# Patient Record
Sex: FEMALE | Race: WHITE | Employment: FULL TIME | URBAN - METROPOLITAN AREA
[De-identification: names, ages, dates, MRNs, and addresses within clinical notes are randomized per-mention and may not be internally consistent; named-entity substitution may affect disease eponyms.]

---

## 2017-10-25 LAB
ANTIBODY SCREEN, EXTERNAL: NEGATIVE
CHLAMYDIA, EXTERNAL: NEGATIVE
HBSAG, EXTERNAL: NEGATIVE
HGB, EXTERNAL: 13.7
HIV, EXTERNAL: NEGATIVE
N. GONORRHEA, EXTERNAL: NEGATIVE
RPR, EXTERNAL: NON REACTIVE
RUBELLA, EXTERNAL: NORMAL
URINALYSIS, EXTERNAL: NEGATIVE

## 2018-05-17 LAB — GRBS, EXTERNAL: POSITIVE

## 2018-06-06 ENCOUNTER — HOSPITAL ENCOUNTER (INPATIENT)
Age: 33
LOS: 3 days | Discharge: HOME | End: 2018-06-09
Attending: OBSTETRICS & GYNECOLOGY | Admitting: OBSTETRICS & GYNECOLOGY
Payer: COMMERCIAL

## 2018-06-06 DIAGNOSIS — R52 PAIN: Primary | ICD-10-CM

## 2018-06-06 LAB
ERYTHROCYTE [DISTWIDTH] IN BLOOD BY AUTOMATED COUNT: 13 % (ref 11.5–14.5)
HCT VFR BLD AUTO: 34.2 % (ref 35–47)
HGB BLD-MCNC: 11.9 G/DL (ref 11.5–16)
MCH RBC QN AUTO: 32.2 PG (ref 26–34)
MCHC RBC AUTO-ENTMCNC: 34.8 G/DL (ref 30–36.5)
MCV RBC AUTO: 92.7 FL (ref 80–99)
NRBC # BLD: 0 K/UL (ref 0–0.01)
NRBC BLD-RTO: 0 PER 100 WBC
PLATELET # BLD AUTO: 124 K/UL (ref 150–400)
PMV BLD AUTO: 10.5 FL (ref 8.9–12.9)
RBC # BLD AUTO: 3.69 M/UL (ref 3.8–5.2)
WBC # BLD AUTO: 7.9 K/UL (ref 3.6–11)

## 2018-06-06 PROCEDURE — 85027 COMPLETE CBC AUTOMATED: CPT | Performed by: OBSTETRICS & GYNECOLOGY

## 2018-06-06 PROCEDURE — 36415 COLL VENOUS BLD VENIPUNCTURE: CPT | Performed by: OBSTETRICS & GYNECOLOGY

## 2018-06-06 PROCEDURE — 65270000029 HC RM PRIVATE

## 2018-06-06 PROCEDURE — 75410000000 HC DELIVERY VAGINAL/SINGLE: Performed by: OBSTETRICS & GYNECOLOGY

## 2018-06-06 PROCEDURE — 59200 INSERT CERVICAL DILATOR: CPT | Performed by: OBSTETRICS & GYNECOLOGY

## 2018-06-06 PROCEDURE — 76060000078 HC EPIDURAL ANESTHESIA: Performed by: ANESTHESIOLOGY

## 2018-06-06 PROCEDURE — 75410000002 HC LABOR FEE PER 1 HR: Performed by: OBSTETRICS & GYNECOLOGY

## 2018-06-06 PROCEDURE — 77010026065 HC OXYGEN MINIMUM MEDICAL AIR: Performed by: OBSTETRICS & GYNECOLOGY

## 2018-06-06 PROCEDURE — 75410000003 HC RECOV DEL/VAG/CSECN EA 0.5 HR: Performed by: OBSTETRICS & GYNECOLOGY

## 2018-06-06 RX ORDER — NALOXONE HYDROCHLORIDE 0.4 MG/ML
0.4 INJECTION, SOLUTION INTRAMUSCULAR; INTRAVENOUS; SUBCUTANEOUS AS NEEDED
Status: DISCONTINUED | OUTPATIENT
Start: 2018-06-06 | End: 2018-06-07 | Stop reason: SDUPTHER

## 2018-06-06 RX ORDER — LIDOCAINE HYDROCHLORIDE 10 MG/ML
10 INJECTION, SOLUTION EPIDURAL; INFILTRATION; INTRACAUDAL; PERINEURAL ONCE
Status: ACTIVE | OUTPATIENT
Start: 2018-06-07 | End: 2018-06-07

## 2018-06-06 RX ORDER — ONDANSETRON 2 MG/ML
4 INJECTION INTRAMUSCULAR; INTRAVENOUS
Status: DISCONTINUED | OUTPATIENT
Start: 2018-06-06 | End: 2018-06-07 | Stop reason: HOSPADM

## 2018-06-06 NOTE — IP AVS SNAPSHOT
[address] 
[phone] Patient: Yamileth Chavez MRN: [id number] :1985 A check mary indicates which time of day the medication should be taken. My Medications START taking these medications Instructions Each Dose to Equal  
 Morning Noon Evening Bedtime  
 ibuprofen 800 mg tablet Commonly known as:  MOTRIN Your last dose was: Your next dose is: Take 1 Tab by mouth every eight (8) hours. 800 mg CHANGE how you take these medications Instructions Each Dose to Equal  
 Morning Noon Evening Bedtime  
 oxyCODONE-acetaminophen 5-325 mg per tablet Commonly known as:  PERCOCET What changed:   
- when to take this 
- reasons to take this - Another medication with the same name was removed. Continue taking this medication, and follow the directions you see here. Your last dose was: Your next dose is: Take 1 Tab by mouth every six (6) hours as needed. Max Daily Amount: 4 Tabs. 1 Tab CONTINUE taking these medications Instructions Each Dose to Equal  
 Morning Noon Evening Bedtime LEXAPRO 5 mg tablet Generic drug:  escitalopram oxalate Your last dose was: Your next dose is: Take 5 mg by mouth daily. 5 mg PROBIOTIC 4X 10-15 mg Tbec Generic drug:  B.infantis-B.ani-B.long-B.bifi Your last dose was: Your next dose is: Take  by mouth. STOP taking these medications ELMIRON 100 mg capsule Generic drug:  pentosan polysulfate sodium  
   
  
 norethindrone-ethinyl estradiol 1 mg-20 mcg (21)/75 mg (7) Tab Commonly known as:  JUNEL FE  (28)  
   
  
 predniSONE 20 mg tablet Commonly known as:  DELTASONE  
   
  
 PRENATAL AD PO  
   
  
 SULFAZINE PO  
   
  
  
  
 Where to Get Your Medications Information on where to get these meds will be given to you by the nurse or doctor. ! Ask your nurse or doctor about these medications  
  ibuprofen 800 mg tablet  
 oxyCODONE-acetaminophen 5-325 mg per tablet

## 2018-06-06 NOTE — IP AVS SNAPSHOT
303 [facility] 
 
 
 [address] [place] [address] 70 [facility] 
[phone] Patient: Jose Moseley MRN: [id number] :1985 About your hospitalization You were admitted on:  2018 You last received care in the:  [facility] You were discharged on:  2018 Why you were hospitalized Your primary diagnosis was:  Not on File Your diagnoses also included:  Pregnancy With 39 Completed Weeks Gestation, Pregnancy Follow-up Information Follow up With Details Comments Contact Info Gerry Richter MD   [address] [facility] [address] [place] [address] 
[phone] Discharge Orders None A check mary indicates which time of day the medication should be taken. My Medications START taking these medications Instructions Each Dose to Equal  
 Morning Noon Evening Bedtime  
 ibuprofen 800 mg tablet Commonly known as:  MOTRIN Your last dose was: Your next dose is: Take 1 Tab by mouth every eight (8) hours. 800 mg CHANGE how you take these medications Instructions Each Dose to Equal  
 Morning Noon Evening Bedtime  
 oxyCODONE-acetaminophen 5-325 mg per tablet Commonly known as:  PERCOCET What changed:   
- when to take this 
- reasons to take this - Another medication with the same name was removed. Continue taking this medication, and follow the directions you see here. Your last dose was: Your next dose is: Take 1 Tab by mouth every six (6) hours as needed. Max Daily Amount: 4 Tabs. 1 Tab CONTINUE taking these medications Instructions Each Dose to Equal  
 Morning Noon Evening Bedtime LEXAPRO 5 mg tablet Generic drug:  escitalopram oxalate Your last dose was: Your next dose is: Take 5 mg by mouth daily. 5 mg PROBIOTIC 4X 10-15 mg Tbec Generic drug:  B.infantis-B.ani-B.long-B.bifi Your last dose was: Your next dose is: Take  by mouth. STOP taking these medications ELMIRON 100 mg capsule Generic drug:  pentosan polysulfate sodium  
   
  
 norethindrone-ethinyl estradiol 1 mg-20 mcg (21)/75 mg (7) Tab Commonly known as:  JUNEL FE 1/20 (28)  
   
  
 predniSONE 20 mg tablet Commonly known as:  DELTASONE  
   
  
 PRENATAL AD PO  
   
  
 SULFAZINE PO Where to Get Your Medications Information on where to get these meds will be given to you by the nurse or doctor. ! Ask your nurse or doctor about these medications  
  ibuprofen 800 mg tablet  
 oxyCODONE-acetaminophen 5-325 mg per tablet Opioid Education Prescription Opioids: What You Need to Know: 
 
Prescription opioids can be used to help relieve moderate-to-severe pain and are often prescribed following a surgery or injury, or for certain health conditions. These medications can be an important part of treatment but also come with serious risks. Opioids are strong pain medicines. Examples include hydrocodone, oxycodone, fentanyl, and morphine. Heroin is an example of an illegal opioid. It is important to work with your health care provider to make sure you are getting the safest, most effective care. WHAT ARE THE RISKS AND SIDE EFFECTS OF OPIOID USE? Prescription opioids carry serious risks of addiction and overdose, especially with prolonged use. An opioid overdose, often marked by slow breathing, can cause sudden death. The use of prescription opioids can have a number of side effects as well, even when taken as directed. · Tolerance-meaning you might need to take more of a medication for the same pain relief · Physical dependence-meaning you have symptoms of withdrawal when the medication is stopped. Withdrawal symptoms can include nausea, sweating, chills, diarrhea, stomach cramps, and muscle aches. Withdrawal can last up to several weeks, depending on which drug you took and how long you took it. · Increased sensitivity to pain · Constipation · Nausea, vomiting, and dry mouth · Sleepiness and dizziness · Confusion · Depression · Low levels of testosterone that can result in lower sex drive, energy, and strength · Itching and sweating RISKS ARE GREATER WITH:      
· History of drug misuse, substance use disorder, or overdose · Mental health conditions (such as depression or anxiety) · Sleep apnea · Older age (72 years or older) · Pregnancy Avoid alcohol while taking prescription opioids. Also, unless specifically advised by your health care provider, medications to avoid include: · Benzodiazepines (such as Xanax or Valium) · Muscle relaxants (such as Soma or Flexeril) · Hypnotics (such as Ambien or Lunesta) · Other prescription opioids KNOW YOUR OPTIONS Talk to your health care provider about ways to manage your pain that don't involve prescription opioids. Some of these options may actually work better and have fewer risks and side effects. Options may include: 
· Pain relievers such as acetaminophen, ibuprofen, and naproxen · Some medications that are also used for depression or seizures · Physical therapy and exercise · Counseling to help patients learn how to cope better with triggers of pain and stress. · Application of heat or cold compress · Massage therapy · Relaxation techniques Be Informed Make sure you know the name of your medication, how much and how often to take it, and its potential risks & side effects. IF YOU ARE PRESCRIBED OPIOIDS FOR PAIN: 
· Never take opioids in greater amounts or more often than prescribed. Remember the goal is not to be pain-free but to manage your pain at a tolerable level. · Follow up with your primary care provider to: · Work together to create a plan on how to manage your pain. · Talk about ways to help manage your pain that don't involve prescription opioids. · Talk about any and all concerns and side effects. · Help prevent misuse and abuse. · Never sell or share prescription opioids · Help prevent misuse and abuse. · Store prescription opioids in a secure place and out of reach of others (this may include visitors, children, friends, and family). · Safely dispose of unused/unwanted prescription opioids: Find your community drug take-back program or your pharmacy mail-back program, or flush them down the toilet, following guidance from the Food and Drug Administration (www.fda.gov/Drugs/ResourcesForYou). · Visit www.cdc.gov/drugoverdose to learn about the risks of opioid abuse and overdose. · If you believe you may be struggling with addiction, tell your health care provider and ask for guidance or call [employer] at 9-404-055-WRRX. Discharge Instructions Discharge Instructions for Vaginal Delivery Patient ID: 
Buddy Sauer 
[id number] 
40 y.o. 
1985 Take Home Medications Continue taking your prenatal vitamins if you are breastfeeding. Follow-up care is a key part of your treatment and safety. Please schedule and keep appointments. Follow-up with your primary OB in 6 weeks. Activity Avoid anything in your vagina for 6 weeks (no intercourse, tampons, or douching). You may drive unless you are taking prescription pain medications. Climbing stairs and light lifting are okay. Please avoid excessive exercise, though walking is okay- you'll be tired! Diet Regular diet as tolerated. Be sure to drink plenty of fluids if you are breastfeeding. Wound care If you have stitches, continue to rinse with a squirt bottle of warm water each time you void for about 7-10 days. .  Your stitches will gradually dissolve over four to eight weeks. Sitz baths are also helpful to keep the wound clean, encourage healing, and to help with pain associated with the stitches or hemorrhoids. You can use either a sitz bath basin or a bathtub filled with 2-3\" inches of plain warm water. Soak for 10 minutes 3 times a day as tolerated. Pain Management 1. Over the counter medications such as Tylenol and ibuprofen (Motrin or Advil) are ideal.  These may be taken together, alternating doses. You may  take the maximum dose:  Motrin or Advil (generic ibuprofen), either 3 tablets every 6 hours or 4 tablets every 8 hours or Tylenol (acetominophen) 1000mg every 6 hours (equivalent to 2 extra strength Tylenol). 2. You may also have a precrescription for stronger pain medication. Take only as needed and transition to over the counter medication in the next few days. Minimize amounts of the prescription medication, as it can be habit-forming and will worsen or cause constipation. Most patients will find that within a couple of days, their pain is adequately controlled using only over-the-counter medications. 3. The prescription pain medication is mixed with Tylenol, therefore, you should not take any extra Tylenol or acetaminophen until you have reduced your prescription pain medication. 4. Add heating pad or sitz baths as needed. Add hemorrhoid wipes or ointments if needed Constipation 1. Constipation is normal after pregnancy and delivery, especially while taking prescription narcotic pain medication. 2. Over the counter remedies including ducosate (Colace), take 1-2 capsules 1-2 times daily for soft stool as needed. You may also add/ try milk of magnesia or rectal remedies such as Dulcolax or Fleets enema. Recovery: What to Expect at [facility] 1. Fatigue is expected.   Try to rest when you can and don't worry about doing housework or other tasks which can wait. 2. The soreness along your bottom will improve significantly over the first 2 weeks, but it may take 6 weeks before you are completely recovered. 3. Back pain or general body aches or muscle soreness are expected and should improve with acetominophen or ibuprofen. 4. Leg swelling due to pregnancy and/or IV fluids given in the hospital will take about two weeks to resolve. 5. Most women experience some form of the \"Baby Blues\" after having a baby. Feeling emotional, tearful, frustrated, anxious, sad, and irritable some of the time is normal and go away after about 2 weeks. Adequate rest and help from your family will help. Take breaks from caring for the baby. Call your doctor if your symptoms seem severe, last more than 2 weeks, or seem to be getting worse instead of better. Get help immediately if you have thoughts of wanting to hurt yourself or others! Call your doctor or seek immediate medical care if you have: 
Heavy vaginal bleeding, soaking through one or more pads an hour for several hours. Foul-smelling discharge from your vagina or incision. Consistent nausea and vomiting and cannot keep fluids down. Consistent pain that does not get better after you take pain medicine. Sudden chest pain and shortness of breath Signs of a blood clot: pain/ swelling/ increasing redness in your lower extremeties Signs of infection: increased pain in your abdomen or vaginal area; red streaks, warmth, or tenderness of your breasts; fever of 100.5 F or greater The [employer] Times Announcement We are excited to announce that we are making your provider's discharge notes available to you in The [employer] Times. You will see these notes when they are completed and signed by the physician that discharged you from your recent hospital stay.   If you have any questions or concerns about any information you see in [employer], please call the [employer] Department where you were seen or reach out to your Primary Care Provider for more information about your plan of care. Introducing [facility]! [address] introduces Keukey patient portal. Now you can access parts of your medical record, email your doctor's office, and request medication refills online. 1. In your internet browser, go to [email or web address] 2. Click on the First Time User? Click Here link in the Sign In box. You will see the New Member Sign Up page. 3. Enter your Keukey Access Code exactly as it appears below. You will not need to use this code after youve completed the sign-up process. If you do not sign up before the expiration date, you must request a new code. · Keukey Access Code: [email or web address] Expires: 9/7/2018 10:00 AM 
 
4. Enter the last four digits of your Social Security Number (xxxx) and Date of Birth (mm/dd/yyyy) as indicated and click Submit. You will be taken to the next sign-up page. 5. Create a Keukey ID. This will be your Keukey login ID and cannot be changed, so think of one that is secure and easy to remember. 6. Create a Keukey password. You can change your password at any time. 7. Enter your Password Reset Question and Answer. This can be used at a later time if you forget your password. 8. Enter your e-mail address. You will receive e-mail notification when new information is available in [address] 9. Click Sign Up. You can now view and download portions of your medical record. 10. Click the Download Summary menu link to download a portable copy of your medical information. If you have questions, please visit the Frequently Asked Questions section of the Keukey website. Remember, Keukey is NOT to be used for urgent needs. For medical emergencies, dial 911. Now available from your iPhone and Android! Introducing Oziel Mondragon As a [facility] patient, I wanted to make you aware of our electronic visit tool called Oziel Mondragon. Frockadvisor/iFit allows you to connect within minutes with a medical provider 24 hours a day, seven days a week via a mobile device or tablet or logging into a secure website from your computer. You can access Oziel Pennfin from anywhere in the [place]. A virtual visit might be right for you when you have a simple condition and feel like you just dont want to get out of bed, or cant get away from work for an appointment, when your regular [facility] provider is not available (evenings, weekends or holidays), or when youre out of town and need minor care. Electronic visits cost only $49 and if the Frockadvisor/iFit provider determines a prescription is needed to treat your condition, one can be electronically transmitted to a nearby pharmacy*. Please take a moment to enroll today if you have not already done so. The enrollment process is free and takes just a few minutes. To enroll, please download the Wurl kayla to your tablet or phone, or visit [email or web address] to enroll on your computer. And, as an [address] [place] [address] patient with a Bound[employer] account, the results of your visits will be scanned into your electronic medical record and your primary care provider will be able to view the scanned results. We urge you to continue to see your regular Torib[facility] provider for your ongoing medical care. And while your primary care provider may not be the one available when you seek a Oziel Buchanantimfin virtual visit, the peace of mind you get from getting a real diagnosis real time can be priceless. For more information on Oziel Buchanantimfin, view our Frequently Asked Questions (FAQs) at [email or web address]. Sincerely, 
 
Dianah Sicard, MD 
Chief Medical Officer Antoinette8 Eri Maldonado *:  certain medications cannot be prescribed via Oziel Mondragon Providers Seen During Your Hospitalization Provider Specialty Primary office phone Genevieve Luna MD Obstetrics & Gynecology [phone] Immunizations Administered for This Admission Name Date MMR 6/9/2018 Your Primary Care Physician (PCP) Primary Care Physician Office Phone Office Fax [address] [phone] You are allergic to the following No active allergies Recent Documentation Height Weight BMI OB Status Smoking Status 1.676 m 72.6 kg 25.82 kg/m2 Pregnant Never Smoker Emergency Contacts Name Discharge Info Relation Home Work Mobile Ghanshyam Hudson DISCHARGE CAREGIVER [3] Spouse [3] [phone] Patient Belongings The following personal items are in your possession at time of discharge: 
  Dental Appliances: None  Visual Aid: None      Home Medications: None   Jewelry: None  Clothing: At bedside    Other Valuables: None Please provide this summary of care documentation to your next provider. Signatures-by signing, you are acknowledging that this After Visit Summary has been reviewed with you and you have received a copy. Patient Signature:  ____________________________________________________________ Date:  ____________________________________________________________  
  
Gloria Hernández Provider Signature:  ____________________________________________________________ Date:  ____________________________________________________________

## 2018-06-07 ENCOUNTER — ANESTHESIA (OUTPATIENT)
Dept: LABOR AND DELIVERY | Age: 33
End: 2018-06-07
Payer: COMMERCIAL

## 2018-06-07 ENCOUNTER — ANESTHESIA EVENT (OUTPATIENT)
Dept: LABOR AND DELIVERY | Age: 33
End: 2018-06-07
Payer: COMMERCIAL

## 2018-06-07 PROBLEM — Z3A.39 PREGNANCY WITH 39 COMPLETED WEEKS GESTATION: Status: ACTIVE | Noted: 2018-06-07

## 2018-06-07 PROCEDURE — 74011250636 HC RX REV CODE- 250/636: Performed by: OBSTETRICS & GYNECOLOGY

## 2018-06-07 PROCEDURE — 75410000002 HC LABOR FEE PER 1 HR: Performed by: OBSTETRICS & GYNECOLOGY

## 2018-06-07 PROCEDURE — 74011250636 HC RX REV CODE- 250/636: Performed by: ANESTHESIOLOGY

## 2018-06-07 PROCEDURE — 3E0R3BZ INTRODUCTION OF ANESTHETIC AGENT INTO SPINAL CANAL, PERCUTANEOUS APPROACH: ICD-10-PCS | Performed by: ANESTHESIOLOGY

## 2018-06-07 PROCEDURE — 00HU33Z INSERTION OF INFUSION DEVICE INTO SPINAL CANAL, PERCUTANEOUS APPROACH: ICD-10-PCS | Performed by: ANESTHESIOLOGY

## 2018-06-07 PROCEDURE — 74011000258 HC RX REV CODE- 258: Performed by: OBSTETRICS & GYNECOLOGY

## 2018-06-07 PROCEDURE — 10907ZC DRAINAGE OF AMNIOTIC FLUID, THERAPEUTIC FROM PRODUCTS OF CONCEPTION, VIA NATURAL OR ARTIFICIAL OPENING: ICD-10-PCS | Performed by: OBSTETRICS & GYNECOLOGY

## 2018-06-07 PROCEDURE — 74011000250 HC RX REV CODE- 250: Performed by: ANESTHESIOLOGY

## 2018-06-07 PROCEDURE — 65270000029 HC RM PRIVATE

## 2018-06-07 PROCEDURE — 74011250636 HC RX REV CODE- 250/636

## 2018-06-07 PROCEDURE — 74011250637 HC RX REV CODE- 250/637: Performed by: OBSTETRICS & GYNECOLOGY

## 2018-06-07 RX ORDER — SODIUM CHLORIDE, SODIUM LACTATE, POTASSIUM CHLORIDE, CALCIUM CHLORIDE 600; 310; 30; 20 MG/100ML; MG/100ML; MG/100ML; MG/100ML
125 INJECTION, SOLUTION INTRAVENOUS CONTINUOUS
Status: DISCONTINUED | OUTPATIENT
Start: 2018-06-07 | End: 2018-06-08

## 2018-06-07 RX ORDER — NALBUPHINE HYDROCHLORIDE 10 MG/ML
10 INJECTION, SOLUTION INTRAMUSCULAR; INTRAVENOUS; SUBCUTANEOUS
Status: DISCONTINUED | OUTPATIENT
Start: 2018-06-07 | End: 2018-06-09 | Stop reason: HOSPADM

## 2018-06-07 RX ORDER — OXYTOCIN/RINGER'S LACTATE 20/1000 ML
125-500 PLASTIC BAG, INJECTION (ML) INTRAVENOUS ONCE
Status: ACTIVE | OUTPATIENT
Start: 2018-06-07 | End: 2018-06-08

## 2018-06-07 RX ORDER — NALOXONE HYDROCHLORIDE 0.4 MG/ML
0.4 INJECTION, SOLUTION INTRAMUSCULAR; INTRAVENOUS; SUBCUTANEOUS AS NEEDED
Status: DISCONTINUED | OUTPATIENT
Start: 2018-06-07 | End: 2018-06-07 | Stop reason: HOSPADM

## 2018-06-07 RX ORDER — SODIUM CHLORIDE 0.9 % (FLUSH) 0.9 %
5-10 SYRINGE (ML) INJECTION EVERY 8 HOURS
Status: DISCONTINUED | OUTPATIENT
Start: 2018-06-07 | End: 2018-06-08

## 2018-06-07 RX ORDER — SIMETHICONE 80 MG
80 TABLET,CHEWABLE ORAL
Status: DISCONTINUED | OUTPATIENT
Start: 2018-06-07 | End: 2018-06-09 | Stop reason: HOSPADM

## 2018-06-07 RX ORDER — OXYTOCIN IN 5 % DEXTROSE 30/500 ML
PLASTIC BAG, INJECTION (ML) INTRAVENOUS
Status: COMPLETED
Start: 2018-06-07 | End: 2018-06-07

## 2018-06-07 RX ORDER — HYDROCORTISONE ACETATE PRAMOXINE HCL 2.5; 1 G/100G; G/100G
CREAM TOPICAL AS NEEDED
Status: DISCONTINUED | OUTPATIENT
Start: 2018-06-07 | End: 2018-06-09 | Stop reason: HOSPADM

## 2018-06-07 RX ORDER — FENTANYL/BUPIVACAINE/NS/PF 2-1250MCG
1-16 PREFILLED PUMP RESERVOIR EPIDURAL CONTINUOUS
Status: DISCONTINUED | OUTPATIENT
Start: 2018-06-07 | End: 2018-06-07 | Stop reason: HOSPADM

## 2018-06-07 RX ORDER — DIPHENHYDRAMINE HCL 25 MG
25 CAPSULE ORAL
Status: DISCONTINUED | OUTPATIENT
Start: 2018-06-07 | End: 2018-06-09 | Stop reason: HOSPADM

## 2018-06-07 RX ORDER — EPHEDRINE SULFATE 50 MG/ML
10 INJECTION, SOLUTION INTRAVENOUS
Status: COMPLETED | OUTPATIENT
Start: 2018-06-07 | End: 2018-06-07

## 2018-06-07 RX ORDER — SODIUM CHLORIDE 0.9 % (FLUSH) 0.9 %
5-10 SYRINGE (ML) INJECTION AS NEEDED
Status: DISCONTINUED | OUTPATIENT
Start: 2018-06-07 | End: 2018-06-09 | Stop reason: HOSPADM

## 2018-06-07 RX ORDER — ONDANSETRON 4 MG/1
4 TABLET, ORALLY DISINTEGRATING ORAL
Status: ACTIVE | OUTPATIENT
Start: 2018-06-07 | End: 2018-06-08

## 2018-06-07 RX ORDER — OXYCODONE AND ACETAMINOPHEN 5; 325 MG/1; MG/1
1 TABLET ORAL
Status: DISCONTINUED | OUTPATIENT
Start: 2018-06-07 | End: 2018-06-09 | Stop reason: HOSPADM

## 2018-06-07 RX ORDER — DOCUSATE SODIUM 100 MG/1
100 CAPSULE, LIQUID FILLED ORAL
Status: DISCONTINUED | OUTPATIENT
Start: 2018-06-07 | End: 2018-06-09 | Stop reason: HOSPADM

## 2018-06-07 RX ORDER — MINERAL OIL
120 OIL (ML) ORAL ONCE
Status: ACTIVE | OUTPATIENT
Start: 2018-06-07 | End: 2018-06-07

## 2018-06-07 RX ORDER — MAG HYDROX/ALUMINUM HYD/SIMETH 200-200-20
30 SUSPENSION, ORAL (FINAL DOSE FORM) ORAL
Status: DISCONTINUED | OUTPATIENT
Start: 2018-06-07 | End: 2018-06-07 | Stop reason: HOSPADM

## 2018-06-07 RX ORDER — SODIUM CHLORIDE, SODIUM LACTATE, POTASSIUM CHLORIDE, CALCIUM CHLORIDE 600; 310; 30; 20 MG/100ML; MG/100ML; MG/100ML; MG/100ML
INJECTION, SOLUTION INTRAVENOUS
Status: DISCONTINUED | OUTPATIENT
Start: 2018-06-07 | End: 2018-06-07 | Stop reason: HOSPADM

## 2018-06-07 RX ORDER — IBUPROFEN 800 MG/1
800 TABLET ORAL EVERY 8 HOURS
Status: DISCONTINUED | OUTPATIENT
Start: 2018-06-07 | End: 2018-06-09 | Stop reason: HOSPADM

## 2018-06-07 RX ORDER — NALOXONE HYDROCHLORIDE 0.4 MG/ML
0.4 INJECTION, SOLUTION INTRAMUSCULAR; INTRAVENOUS; SUBCUTANEOUS AS NEEDED
Status: DISCONTINUED | OUTPATIENT
Start: 2018-06-07 | End: 2018-06-09 | Stop reason: HOSPADM

## 2018-06-07 RX ORDER — SODIUM CHLORIDE 9 MG/ML
125 INJECTION, SOLUTION INTRAVENOUS CONTINUOUS
Status: DISCONTINUED | OUTPATIENT
Start: 2018-06-07 | End: 2018-06-08

## 2018-06-07 RX ORDER — ACETAMINOPHEN 325 MG/1
650 TABLET ORAL
Status: DISCONTINUED | OUTPATIENT
Start: 2018-06-07 | End: 2018-06-07 | Stop reason: HOSPADM

## 2018-06-07 RX ORDER — OXYTOCIN IN 5 % DEXTROSE 30/500 ML
2 PLASTIC BAG, INJECTION (ML) INTRAVENOUS
Status: DISCONTINUED | OUTPATIENT
Start: 2018-06-08 | End: 2018-06-08

## 2018-06-07 RX ORDER — NALBUPHINE HYDROCHLORIDE 10 MG/ML
INJECTION, SOLUTION INTRAMUSCULAR; INTRAVENOUS; SUBCUTANEOUS
Status: DISPENSED
Start: 2018-06-07 | End: 2018-06-07

## 2018-06-07 RX ORDER — ONDANSETRON 2 MG/ML
4 INJECTION INTRAMUSCULAR; INTRAVENOUS
Status: DISCONTINUED | OUTPATIENT
Start: 2018-06-07 | End: 2018-06-07 | Stop reason: SDUPTHER

## 2018-06-07 RX ADMIN — Medication 10 ML: at 18:00

## 2018-06-07 RX ADMIN — PENICILLIN G POTASSIUM 2.5 MILLION UNITS: 20000000 POWDER, FOR SOLUTION INTRAVENOUS at 12:56

## 2018-06-07 RX ADMIN — SODIUM CHLORIDE, SODIUM LACTATE, POTASSIUM CHLORIDE, AND CALCIUM CHLORIDE 1000 ML: 600; 310; 30; 20 INJECTION, SOLUTION INTRAVENOUS at 07:00

## 2018-06-07 RX ADMIN — OXYCODONE HYDROCHLORIDE AND ACETAMINOPHEN 1 TABLET: 5; 325 TABLET ORAL at 22:07

## 2018-06-07 RX ADMIN — SODIUM CHLORIDE, SODIUM LACTATE, POTASSIUM CHLORIDE, AND CALCIUM CHLORIDE 125 ML/HR: 600; 310; 30; 20 INJECTION, SOLUTION INTRAVENOUS at 14:23

## 2018-06-07 RX ADMIN — SODIUM CHLORIDE, SODIUM LACTATE, POTASSIUM CHLORIDE, CALCIUM CHLORIDE: 600; 310; 30; 20 INJECTION, SOLUTION INTRAVENOUS at 07:00

## 2018-06-07 RX ADMIN — Medication 12 ML/HR: at 07:45

## 2018-06-07 RX ADMIN — IBUPROFEN 800 MG: 800 TABLET ORAL at 18:29

## 2018-06-07 RX ADMIN — SODIUM CHLORIDE 5 MILLION UNITS: 900 INJECTION, SOLUTION INTRAVENOUS at 00:18

## 2018-06-07 RX ADMIN — SODIUM CHLORIDE 500 ML: 900 INJECTION, SOLUTION INTRAVENOUS at 14:37

## 2018-06-07 RX ADMIN — NALBUPHINE HYDROCHLORIDE 10 MG: 10 INJECTION, SOLUTION INTRAMUSCULAR; INTRAVENOUS; SUBCUTANEOUS at 01:09

## 2018-06-07 RX ADMIN — PENICILLIN G POTASSIUM 2.5 MILLION UNITS: 20000000 POWDER, FOR SOLUTION INTRAVENOUS at 07:51

## 2018-06-07 RX ADMIN — PENICILLIN G POTASSIUM 2.5 MILLION UNITS: 20000000 POWDER, FOR SOLUTION INTRAVENOUS at 04:04

## 2018-06-07 RX ADMIN — SODIUM CHLORIDE, SODIUM LACTATE, POTASSIUM CHLORIDE, AND CALCIUM CHLORIDE 125 ML/HR: 600; 310; 30; 20 INJECTION, SOLUTION INTRAVENOUS at 09:25

## 2018-06-07 RX ADMIN — PENICILLIN G POTASSIUM 2.5 MILLION UNITS: 20000000 POWDER, FOR SOLUTION INTRAVENOUS at 16:07

## 2018-06-07 RX ADMIN — EPHEDRINE SULFATE 10 MG: 50 INJECTION, SOLUTION INTRAVENOUS at 08:02

## 2018-06-07 RX ADMIN — ONDANSETRON 4 MG: 2 INJECTION INTRAMUSCULAR; INTRAVENOUS at 10:08

## 2018-06-07 RX ADMIN — Medication 30000 MILLI-UNITS: at 16:47

## 2018-06-07 NOTE — L&D DELIVERY NOTE
Delivery Note:     Patient reached FD and pushed with good effort to deliver the fetal head in VICKIE position. Single nuchal cord found. The anterior shoulder, followed by the posterior shoulder and the rest of the body then delivered easily. Single nuchal cord reduced. This was a LBMI with Apgars of 9 and 9 at 1 and 5 minutes respectively, weight pending. The infant was placed on mom's abdomen. The cord was then double clamped and cut by the FOB. Cord blood was taken. The placenta followed spontaneously, intact, with 3VC. Pitocin was added to the IVF and the fundus was firm to palpation. The vagina, cervix and perineum were examined and first degree laceration was found and not repaired as hemostatic.  mL. No complications. Mom and baby doing well. Dr. Jaqui Villarreal delivering.      Fatimah Reynoso MD  [facility]

## 2018-06-07 NOTE — PROGRESS NOTES
06/07/18 3:02 PM  CM met with patient to complete initial assessment and to begin discharge planning. Demographics were reviewed and confirmed. Patient and her /FOB Amber Casillas ([phone]) live together in [place], [place], and have 2 older children, ages 3 and 2. 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 FOB works for [employer] and will have 8 weeks total away from work, although he will only take 4 weeks now and save his last 4 weeks to use in a few months. Supports include patient's mother and other close friends. Patient plans to breast and bottle feed and has a pump to use at home. Dr. Slick Mcdaniel at [address] [facility] office) will provide medical follow up for the baby. Patient has car seat, crib, clothing, and other necessary supplies. Denied need for [facility] and Medicaid services. Care Management Interventions  PCP Verified by CM:  Yes  Mode of Transport at Discharge: Self  Transition of Care Consult (CM Consult): Discharge Planning  Current Support Network: Lives with Spouse  Confirm Follow Up Transport: Family  Plan discussed with Pt/Family/Caregiver: Yes  Discharge Location  Discharge Placement: Home with family assistance  HERBIE Holder

## 2018-06-07 NOTE — ANESTHESIA PROCEDURE NOTES
Epidural Block    Start time: 6/7/2018 7:08 AM  End time: 6/7/2018 7:32 AM  Performed by: Rogelio Gómez  Authorized by: Rogelio Gómez     Pre-Procedure  Indication: labor epidural    Preanesthetic Checklist: patient identified, risks and benefits discussed, anesthesia consent, site marked, patient being monitored, timeout performed and anesthesia consent    Timeout Time: 07:09        Epidural:   Patient position:  Seated  Prep region:  Lumbar  Prep: Chlorhexidine    Location:  L3-4    Needle and Epidural Catheter:   Needle Type:  Tuohy  Needle Gauge:  18 G  Injection Technique:  Loss of resistance using saline  Attempts:  1  Catheter Size:  20 G  Catheter at Skin Depth (cm):  10  Depth in Epidural Space (cm):  5  Events: no blood with aspiration, no cerebrospinal fluid with aspiration, no paresthesia and negative aspiration test    Test Dose:  Lidocaine 1.5% w/ epi and negative    Assessment:   Catheter Secured:  Tegaderm and tape  Insertion:  Uncomplicated  Patient tolerance:  Patient tolerated the procedure well with no immediate complications

## 2018-06-07 NOTE — PROGRESS NOTES
Labor Note    Kobe Parents  [id number]  1985   39w2d      S:  Feeling comfortable with epidural.    O:    Visit Vitals    BP 93/55    Pulse 66    Temp 97.5 °F (36.4 °C)    Resp 14    Ht 5' 6\" (1.676 m)    Wt 72.6 kg (160 lb)    SpO2 100%    BMI 25.82 kg/m2     Cervical Exam  Dilation (cm): 5  Eff: 70 %  Station: 0  Vaginal exam done by? : HENRY Pereira  Membrane Status: AROM    Patient Vitals for the past 4 hrs: Mode Fetal Heart Rate Variability Decelerations Accelerations RN Reviewed Strip? FHR Interventions   18 1130 - - - - - - (P) Oxygen   18 1128 - - - - - - (P) Lateral Right;Other (comment)   18 1122 - - - - - - (P) Lateral Right   18 1100 External 100 6-25 BPM Early;Variable Yes Yes -   18 1030 External 115 6-25 BPM None Yes Yes -   18 1014 - - - - - - IV Fluid Bolus   18 1000 External 110 6-25 BPM None Yes Yes -   18 0900 External 115 6-25 BPM None Yes Yes -   18 0830 External 105 6-25 BPM None Yes Yes -   18 0800 External 105 6-25 BPM (!) Early; Late Yes Yes -       A/P:  29 y.o.  @ 39w2d - labor   1. [facility]/[place]-- IUPC, FSE placed for closer monitoring given baseline 100s w intermittent variable decelerations. Continue to monitor. COnsider AI. 2.  GBS pos / Rh pos  3. Pitocin n/a at this time  4. Pain control - epidural  5. Lara 2-4 hours, or prn. Expect .       Clayton Dvais MD  [facility]

## 2018-06-07 NOTE — PROGRESS NOTES
07:00- OB SBAR report received from Olaf Mullins at bedside. Pt ready for epidural placement. MD Cruz at bedside to begin procedure. 07:09- Time out completed for epidural placement. 08:06- MD Cruz notified of pt decrease in BP after epidural. BP 70s/30s. RN notified pt given 10mg ephedrine IV per order with minimal improvement in BP. Pt baseline 100s-90s/50s. Pt currently asymptomatic. MD states to decrease rate of epidural from 12ml/hr to 8ml/hr. Bolus LR if needed per orders. 10:15- BP has normalized until pt turned to L side. BP cuff on R arm. BP now 70s/30s. Pt asymptomatic except for nausea which may also be related to epidural, clear liquid diet, or hormonal fluctuations. Zofran given for nausea. IV fluid bolus of 500cc initiated. Will continue to monitor BP.  14:05- RN received VORB from MD Candi to check cervix. If no change, start pitocin. If pt has made cervical change, no pitocin needed at this time. 14:40- Amnio infusion bolus initiated per order  14:55- Amnioinfusion rate decreased from 999ml/hr to 500ml/hr and pt given IV fluid bolus  14:57- Pt BP still variable from SBP 100s-70s and SBP from 90s-40s. Pt has remained asymptomatic.  16:12- MD Candi notified pt FD. MD states RN does not need to begin pushing as pt is a multip. Plan to ready room for delivery and await MD.  16:30- MD at bedside for delivery  16:43- Pt delivered viable infant male via  18 at 16:43. First degree without repair. Spontaneous removal of placenta and PPP per orders. Infant skin to skin with patient at delivery. Nursery RN at bedside. Apgars 9/9 at 1 and 5 min.

## 2018-06-07 NOTE — PROGRESS NOTES
1937  VS and assessment completed. Pt reports that she got up to go to the bathroom with her mothers assistance. Pt states that she was able to urinate w/o difficulty and her bleeding was ok. Pt reminded that she needs to let the nurse know when she needs to use the bathroom, pt verbalized understanding of instructions. 2015 TRANSFER - OUT REPORT:    Verbal bedside report given to HENRY Carlson on Mukesh Mcnally  being transferred to [facility] for routine progression of care       Report consisted of patients Situation, Background, Assessment and   Recommendations(SBAR). Information from the following report(s) SBAR, Procedure Summary, Intake/Output and MAR was reviewed with the receiving nurse. Lines:   Peripheral IV 06/06/18 Left Hand (Active)   Site Assessment Clean, dry, & intact 6/7/2018  3:14 PM   Phlebitis Assessment 0 6/7/2018  3:14 PM   Infiltration Assessment 0 6/7/2018  3:14 PM   Dressing Status Clean, dry, & intact 6/7/2018  3:14 PM   Dressing Type Transparent 6/7/2018  3:14 PM   Hub Color/Line Status Infusing 6/7/2018  3:14 PM   Alcohol Cap Used Yes 6/7/2018  3:14 PM        Opportunity for questions and clarification was provided.       Patient transported with:   Registered Nurse

## 2018-06-07 NOTE — PROGRESS NOTES
Labor Note    Benigno Ugalde  [id number]  1985   39w2d      S:  Feeling comfortable with epidural.    O:    Visit Vitals    /59    Pulse (!) 46    Temp 97.5 °F (36.4 °C)    Resp 14    Ht 5' 6\" (1.676 m)    Wt 72.6 kg (160 lb)    SpO2 100%    BMI 25.82 kg/m2     Cervical Exam  Dilation (cm): 4  Eff: 70 %  Vaginal exam done by? : Hemple  Membrane Status: AROM    Patient Vitals for the past 4 hrs: Mode Fetal Heart Rate Variability Decelerations Accelerations RN Reviewed Strip? Provider who reviewed strip?   18 0630 External 110 6-25 BPM None Yes Yes Mirella   18 0600 External 100 6-25 BPM Variable Yes Yes -   18 0530 External 100 6-25 BPM None Yes Yes -   18 0500 External 95 6-25 BPM None Yes Yes -       A/P:  29 y.o.  @ 39w2d - labor   1. CEFM/Doran  2. GBS pos / Rh pos  3. Pitocin as needed (none currently, sp AROM)  4.  Pain control - epidural  5. Lara 4 hours, or prn. Expect .       Kelly Tran MD  [facility]

## 2018-06-07 NOTE — H&P
Labor and Delivery Admission Note  2018    Patient is a 29 y.o. [id number] at 39w2d here for scheduled elective induction of labor. PNC: Blood type: O            RH: pos            Hep B: neg            Rubella: equivocal            GBS status: positive    OBHX:   x2, SAB x 1  OB History      Para Term  AB Living    4 2 2  1 1    SAB TAB Ectopic Molar Multiple Live Births    1    0 1          PMH:   Past Medical History:   Diagnosis Date    Psychiatric problem     Urinary frequency        PSH:   Past Surgical History:   Procedure Laterality Date    HX APPENDECTOMY      2006    HX HEENT      wisdom teeth    HX OTHER SURGICAL      psoriatic arthritis    HX OTHER SURGICAL      mild colitis    HX WISDOM TEETH EXTRACTION Bilateral        OB/GYN:[facility]    Meds:   Prior to Admission Medications   Prescriptions Last Dose Informant Patient Reported? Taking? B.infantis-B.ani-B.long-B.bifi (PROBIOTIC 4X) 10-15 mg TbEC 2018 at 0900  Yes Yes   Sig: Take  by mouth. PRENATAL VIT 15/IRON CB/FA/DSS (PRENATAL AD PO) Not Taking at Unknown time  Yes No   Sig: Take  by mouth daily. SULFASALAZINE (SULFAZINE PO) Not Taking at Unknown time  Yes No   Sig: Take  by mouth four (4) times daily. escitalopram oxalate (LEXAPRO) 5 mg tablet 2018 at 0900  Yes Yes   Sig: Take 5 mg by mouth daily. norethindrone-ethinyl estradiol (JUNEL FE , ,) 1-20 mg-mcg per tablet Not Taking at Unknown time  No No   Sig: Take 1 Tab by mouth daily. oxyCODONE-acetaminophen (PERCOCET) 5-325 mg per tablet Not Taking at Unknown time  No No   Sig: Take 1 Tab by mouth every four (4) hours as needed for Pain. oxyCODONE-acetaminophen (PERCOCET) 5-325 mg per tablet Not Taking at Unknown time  No No   Sig: Take 2 Tabs by mouth every four (4) hours as needed. Max Daily Amount: 12 Tabs. pentosan polysulfate sodium (ELMIRON) 100 mg capsule Not Taking at Unknown time  Yes No   Sig: Take 100 mg by mouth two (2) times a day. predniSONE (DELTASONE) 20 mg tablet Not Taking at Unknown time  Yes No   Sig: Take 20 mg by mouth daily. Facility-Administered Medications: None       Allergies: No Known Allergies    Pertinent ROS: Review of Systems - Negative   FMH:   Family History   Problem Relation Age of Onset    Hypertension Father        SH:   Social History     Social History    Marital status:      Spouse name: N/A    Number of children: N/A    Years of education: N/A       Social History Main Topics    Smoking status: Never Smoker    Smokeless tobacco: Never Used    Alcohol use No    Drug use: No    Sexual activity: Yes     Partners: Male     Birth control/ protection: None           OBJECTIVE:    Temp (24hrs), Av.9 °F (37.2 °C), Min:98.9 °F (37.2 °C), Max:98.9 °F (37.2 °C)    Visit Vitals    BP 96/47    Pulse 67    Temp 98.9 °F (37.2 °C)    Resp 14    Ht 5' 6\" (1.676 m)    SpO2 99%       FHR baseline 100, + accels, no decels, moderate variability  [place]     Exam:  General: alert  HEENT:  normal   Lungs:  clear  Cor:  RRR  Abdomen:  Soft, non-tender                    Clinical EFW 7 lbs  Cervix:  1/40/high; vertex  Fluid:  None seen  Pelvimetry:  AP-good                      Arch- adequate                      Sidewalls- adequate                      Pelvis feels adequate for fetus. Extremities:  normal, no edema      Impression:  at 39w2d, GBS+, IOL. Plan:  Discussed ripening with Ana Bougie catheter as per existing plan of care discussed by Dr. Gaetano Grant at sign-out. Patient amenable to this. Ana Bougie catheter placed via  guidelines without difficulty; 40cc placed in each balloon. Will start penicillin for GBS prophylaxis.     Davi Middleton MD  12:02 AM

## 2018-06-07 NOTE — ANESTHESIA PREPROCEDURE EVALUATION
Anesthetic History   No history of anesthetic complications            Review of Systems / Medical History  Patient summary reviewed, nursing notes reviewed and pertinent labs reviewed    Pulmonary  Within defined limits                 Neuro/Psych   Within defined limits           Cardiovascular  Within defined limits                Exercise tolerance: >4 METS     GI/Hepatic/Renal  Within defined limits              Endo/Other        Arthritis     Other Findings              Physical Exam    Airway  Mallampati: II  TM Distance: > 6 cm  Neck ROM: normal range of motion   Mouth opening: Normal     Cardiovascular  Regular rate and rhythm,  S1 and S2 normal,  no murmur, click, rub, or gallop  Rhythm: regular  Rate: normal         Dental  No notable dental hx       Pulmonary  Breath sounds clear to auscultation               Abdominal         Other Findings            Anesthetic Plan    ASA: 2  Anesthesia type: CSE            Anesthetic plan and risks discussed with: Patient

## 2018-06-07 NOTE — DISCHARGE SUMMARY
Obstetrical Discharge Summary     Name: Ángel Grijalva MRN: [id number]  SSN: [id number]    YOB: 1985  Age: 29 y.o. Sex: female      Admit Date: 6/6/2018    Discharge Date: 6/9/18     Attending Physician:  Trevon Henriquez MD     Delivering Physician:  Mayelin Cohn MD     * Admission Diagnoses:   IUP @ 39w2d      * Discharge Diagnoses:   Delivery of a VMI via TSVD by Mayelin Cohn MD on 6/7/2018. Apgars were 9 and 9. Additional Diagnoses:   Hospital Problems as of 6/7/2018  Date Reviewed: 6/7/2018          Codes Class Noted - Resolved POA    Pregnancy with 39 completed weeks gestation ICD-10-CM: Z3A.39  ICD-9-CM: V22.2  6/7/2018 - Present Unknown             Lab Results   Component Value Date/Time    Rubella, External equivocal 10/25/2017 08:00 AM    GrBStrep, External positive 05/17/2018 08:00 AM      Immunization History   Administered Date(s) Administered    Tdap 06/28/2015       * Procedures:   TSVD         * Discharge Condition: good    * Hospital Course: Normal hospital course following the delivery. * Disposition: Home    Discharge Medications:   Current Discharge Medication List          * Follow-up Care/Patient Instructions: Activity: Activity as tolerated  Diet: Regular Diet  Wound Care: As directed      Followup 2 weeks for PP check  Or prn given hx of anxiety on meds.        Signed By:  Trevon Henriquez MD     June 7, 2018

## 2018-06-07 NOTE — PROGRESS NOTES
2230  Patient arrived on unit for herrera bulb IOL. 46  Dr Martha Hussein at bedside to place Remonia Lis Catheter FB. 40cc sterile water placed in inner and outer balloon. MD aware of low fetal baseline heart rate. Per MD, continue to monitor. 0400  Patient up to restroom. [address] with Dr Martha Hussein regarding patient low fetal heartrate baseline. Per MD, no further narcotics. Per MD continue to monitor.

## 2018-06-08 PROCEDURE — 65270000029 HC RM PRIVATE

## 2018-06-08 PROCEDURE — 77030018846 HC SOL IRR STRL H20 ICUM -A

## 2018-06-08 PROCEDURE — 74011250637 HC RX REV CODE- 250/637: Performed by: OBSTETRICS & GYNECOLOGY

## 2018-06-08 RX ADMIN — OXYCODONE HYDROCHLORIDE AND ACETAMINOPHEN 1 TABLET: 5; 325 TABLET ORAL at 16:04

## 2018-06-08 RX ADMIN — OXYCODONE HYDROCHLORIDE AND ACETAMINOPHEN 1 TABLET: 5; 325 TABLET ORAL at 02:31

## 2018-06-08 RX ADMIN — IBUPROFEN 800 MG: 800 TABLET ORAL at 17:55

## 2018-06-08 RX ADMIN — IBUPROFEN 800 MG: 800 TABLET ORAL at 02:31

## 2018-06-08 RX ADMIN — IBUPROFEN 800 MG: 800 TABLET ORAL at 10:01

## 2018-06-08 RX ADMIN — DOCUSATE SODIUM 100 MG: 100 CAPSULE, LIQUID FILLED ORAL at 12:19

## 2018-06-08 RX ADMIN — OXYCODONE HYDROCHLORIDE AND ACETAMINOPHEN 1 TABLET: 5; 325 TABLET ORAL at 12:19

## 2018-06-08 RX ADMIN — OXYCODONE HYDROCHLORIDE AND ACETAMINOPHEN 1 TABLET: 5; 325 TABLET ORAL at 20:04

## 2018-06-08 RX ADMIN — OXYCODONE HYDROCHLORIDE AND ACETAMINOPHEN 1 TABLET: 5; 325 TABLET ORAL at 07:14

## 2018-06-08 NOTE — PROGRESS NOTES
PostPartum Note    Lise Marr  [id number]  1985  29 y.o.    S:  Ms. Lise Marr is a 29 y.o.  P1D # s/p  @ 39w3d. Doing well. She had a baby boy. Her lochia is like a period. She describes her pain as mild and is well controlled with PO medications. She is breast feeding and this is going well. She is ambulating and voiding. Tolerating PO intake. O:   Visit Vitals    /63 (BP 1 Location: Left arm, BP Patient Position: At rest)    Pulse (!) 56    Temp 98.2 °F (36.8 °C)    Resp 16    Ht 5' 6\" (1.676 m)    Wt 72.6 kg (160 lb)    SpO2 100%    BMI 25.82 kg/m2       Lab Results   Component Value Date/Time    WBC 7.9 2018 11:20 PM    HGB 11.9 2018 11:20 PM    HCT 34.2 (L) 2018 11:20 PM    PLATELET 146 (L)  11:20 PM    MCV 92.7 2018 11:20 PM    Hgb, External 13.7 10/25/2017 08:00 AM    Hct, External 36.1 2014    Platelet cnt., External 99 06/10/2015       Gen - No acute distress  Abdomen - Fundus firm, below the umbilicus   Ext - Warm, well perfused. Nontender    A/P:  PPD #1 s/p  @ 39w3d doing well. 1.  Routine PP instructions/ care discussed  2. Blood type - Rh +  3. Rubella equiv - MMR PP   4. Circumcision if desired    5. Discharge PPD#2    6. F/U 4-6 weeks for PP check.       Blanche Putnam MD  [facility]

## 2018-06-08 NOTE — PROGRESS NOTES
Bedside and Verbal shift change report given to Margarette Paula RN (oncoming nurse) by Yordy Kelly RNC (offgoing nurse). Report included the following information SBAR, Kardex, Intake/Output, MAR and Recent Results.

## 2018-06-08 NOTE — ROUTINE PROCESS
Bedside and Verbal shift change report given to Zully Narvaez RN (oncoming nurse) by Cecile Davalos RN (offgoing nurse). Report included the following information SBAR, Kardex, Intake/Output and MAR.

## 2018-06-08 NOTE — LACTATION NOTE
Problem: Lactation Care Plan  Goal: *Infant latching appropriately  Outcome: Progressing Towards Goal  Mom states comfortable and relaxed with breast feeding. f Using shield because used with last baby and states her nipples stay flat at times.      Pt will successfully establish breastfeeding by feeding in response to early feeding cues   or wake every 3h, will obtain deep latch, and will keep log of feedings/output. Taught to BF at hunger cues and or q 2-3 hrs and to offer 10-20 drops of hand expressed colostrum at any non-feeds.       Breast Assessment  Left Breast: Medium  Left Nipple: Everted, Intact, Short  Right Nipple: Everted, Intact, Short  Breast- Feeding Assessment  Attends Breast-Feeding Classes: No  Breast-Feeding Experience: Yes (about 2-4 months plus pumped for 1 year)  Breast Trauma/Surgery: No  Type/Quality: Good (per mom, not seen at breast)        Goal: *Weight loss less than 10% of birth weight  Outcome: Progressing Towards Goal     Encouraged mom to attempt feeding with baby led feeding cues. Just as sucking on fingers, rooting, mouthing. Looking for 8-12 feedings in 24 hours. Don't limit baby at breast, allow baby to come of breast on it's own. Baby may want to feed  often and may increase number of feedings on second day of life. Skin to skin encouraged.       If baby doesn't nurse,  Mom should  hand express  10-20 drops of colostrum and drip into baby's mouth, or give to baby by finger feeding, cup feeding, or spoon feeding at least every 2-3 hours.         Problem: Patient Education: Go to Patient Education Activity  Goal: Patient/Family Education  Outcome: Progressing Towards Goal  Reviewed breastfeeding basics:  Supply and demand,  stomach size, early  Feeding cues, skin to skin, positioning and baby led latch-on, assymetrical latch with signs of good, deep latch vs shallow, feeding frequency and duration, and log sheet for tracking infant feedings and output.   Breastfeeding Booklet and Warm line information given. Discussed typical  weight loss and the importance of infant weight checks with pediatrician 1-2 post discharge.     Discussed with mother her plan for feeding. Reviewed the benefits of exclusive breast milk feeding during the hospital stay. Informed her of the risks of using formula to supplement in the first few days of life as well as the benefits of successful breast milk feeding; referred her to the Breastfeeding booklet about this information. She acknowledges understanding of information reviewed and states that it is her plan to both breast feed and formula feed and plans to pump also to her infant.   Will support her choice and offer additional information as needed.

## 2018-06-08 NOTE — ROUTINE PROCESS
SBAR IN Report: Mother    Verbal report received from Vicente Ceballos RN (full name & credentials) on this patient, who is now being transferred from L&D (unit) for routine progression of care. Report consisted of patient's Situation, Background, Assessment and Recommendations (SBAR). [place] ID bands were compared with the identification form, and verified with the patient and transferring nurse. Information from the SBAR, Kardex, Intake/Output and MAR and the [place] Report was reviewed with the transferring nurse; opportunity for questions and clarification provided.

## 2018-06-08 NOTE — PROGRESS NOTES
1217 Pt is sitting in the bed and she is holding the baby  1311 Pt is sitting in the bed and she is watching the baby have his pictures taken  1420 Pt is sitting in the bed and she is eating lunch

## 2018-06-08 NOTE — PROGRESS NOTES
Bedside and Verbal shift change report given to May Oliveira RN (oncoming nurse) by Renard Ragsdale RN (offgoing nurse). Report included the following information SBAR, Intake/Output and MAR.

## 2018-06-09 VITALS
BODY MASS INDEX: 25.71 KG/M2 | TEMPERATURE: 97.8 F | SYSTOLIC BLOOD PRESSURE: 105 MMHG | RESPIRATION RATE: 15 BRPM | DIASTOLIC BLOOD PRESSURE: 59 MMHG | OXYGEN SATURATION: 100 % | WEIGHT: 160 LBS | HEIGHT: 66 IN | HEART RATE: 51 BPM

## 2018-06-09 PROCEDURE — 74011250637 HC RX REV CODE- 250/637: Performed by: OBSTETRICS & GYNECOLOGY

## 2018-06-09 PROCEDURE — 90707 MMR VACCINE SC: CPT | Performed by: OBSTETRICS & GYNECOLOGY

## 2018-06-09 PROCEDURE — 74011250636 HC RX REV CODE- 250/636: Performed by: OBSTETRICS & GYNECOLOGY

## 2018-06-09 RX ORDER — IBUPROFEN 800 MG/1
800 TABLET ORAL EVERY 8 HOURS
Qty: 21 TAB | Refills: 1 | Status: SHIPPED | OUTPATIENT
Start: 2018-06-09

## 2018-06-09 RX ORDER — OXYCODONE AND ACETAMINOPHEN 5; 325 MG/1; MG/1
1 TABLET ORAL
Qty: 12 TAB | Refills: 0 | Status: SHIPPED | OUTPATIENT
Start: 2018-06-09

## 2018-06-09 RX ADMIN — OXYCODONE HYDROCHLORIDE AND ACETAMINOPHEN 1 TABLET: 5; 325 TABLET ORAL at 01:26

## 2018-06-09 RX ADMIN — MEASLES, MUMPS, AND RUBELLA VIRUS VACCINE LIVE 0.5 ML: 1000; 12500; 1000 INJECTION, POWDER, LYOPHILIZED, FOR SUSPENSION SUBCUTANEOUS at 10:57

## 2018-06-09 RX ADMIN — OXYCODONE HYDROCHLORIDE AND ACETAMINOPHEN 1 TABLET: 5; 325 TABLET ORAL at 07:50

## 2018-06-09 RX ADMIN — IBUPROFEN 800 MG: 800 TABLET ORAL at 09:25

## 2018-06-09 RX ADMIN — IBUPROFEN 800 MG: 800 TABLET ORAL at 01:26

## 2018-06-09 NOTE — PROGRESS NOTES
Dr Dwight Blanca notified of patient's EPDS score of 14. Medications reviewed with patient and patient told to follow up in 2 weeks or sooner if needed.

## 2018-06-09 NOTE — LACTATION NOTE
This note was copied from a baby's chart. Pt will successfully establish breastfeeding by feeding in response to early feeding cues   or wake every 3h, will obtain deep latch, and will keep log of feedings/output. Taught to BF at hunger cues and or q 2-3 hrs and to offer 10-20 drops of hand expressed colostrum at any non-feeds. Breast Assessment  Left Breast: Medium  Left Nipple: Everted, Intact, Short  Right Nipple: Everted, Intact, Short  Breast- Feeding Assessment  Attends Breast-Feeding Classes: No  Breast-Feeding Experience: Yes (about 2-4 months plus pumped for 1 year)  Breast Trauma/Surgery: No  Type/Quality: Good (per Mom)        LATCH Documentation  Latch: Grasps breast, tongue down, lips flanged, rhythmic sucking (per Mom)  Audible Swallowing: Spontaneous and intermittent (24 hours old)  Type of Nipple: Flat  Comfort (Breast/Nipple): Soft/non-tender  Hold (Positioning): No assist from staff, mother able to position/hold infant  LATCH Score: 9  Chart shows numerous feedings, void, stool WNL. Discussed importance of monitoring outputs and feedings on first week of life. Discussed ways to tell if baby is  getting enough breast milk, ie  voids and stools, change in color of stool, and return to birth wt within 2 weeks. Follow up with pediatrician visit for weight check in 1-2 days (per AAP guidelines.)  Encouraged to call Warm Line  [phone] or [facility] at [phone] for any questions/problems that arise. Mother also given breastfeeding support group dates and times for any future needsAnticipatory guidance given. Questions answered. Discussed signs of baby's allergy, excema. Discussed engorgement management, when breast are soft and flat you are making more milk than when hard and engorged. If you should have to take a medication and MD says can't breast feed contact lactation office. Breast feed if you or the baby gets sick to pass along natural immunologic protection.

## 2018-06-09 NOTE — DISCHARGE INSTRUCTIONS
Discharge Instructions for Vaginal Delivery    Patient ID:  Yamileth Chavez  [id number]  49 y.o.  1985    Take Home Medications       Continue taking your prenatal vitamins if you are breastfeeding. Follow-up care is a key part of your treatment and safety. Please schedule and keep appointments. Follow-up with your primary OB in 6 weeks. Activity  Avoid anything in your vagina for 6 weeks (no intercourse, tampons, or douching). You may drive unless you are taking prescription pain medications. Climbing stairs and light lifting are okay. Please avoid excessive exercise, though walking is okay- you'll be tired! Diet  Regular diet as tolerated. Be sure to drink plenty of fluids if you are breastfeeding. Wound care  If you have stitches, continue to rinse with a squirt bottle of warm water each time you void for about 7-10 days. .  Your stitches will gradually dissolve over four to eight weeks. Sitz baths are also helpful to keep the wound clean, encourage healing, and to help with pain associated with the stitches or hemorrhoids. You can use either a sitz bath basin or a bathtub filled with 2-3\" inches of plain warm water. Soak for 10 minutes 3 times a day as tolerated. Pain Management  1. Over the counter medications such as Tylenol and ibuprofen (Motrin or Advil) are ideal.  These may be taken together, alternating doses. You may  take the maximum dose:  Motrin or Advil (generic ibuprofen), either 3 tablets every 6 hours or 4 tablets every 8 hours or Tylenol (acetominophen) 1000mg every 6 hours (equivalent to 2 extra strength Tylenol). 2. You may also have a precrescription for stronger pain medication. Take only as needed and transition to over the counter medication in the next few days. Minimize amounts of the prescription medication, as it can be habit-forming and will worsen or cause constipation.  Most patients will find that within a couple of days, their pain is adequately controlled using only over-the-counter medications. 3. The prescription pain medication is mixed with Tylenol, therefore, you should not take any extra Tylenol or acetaminophen until you have reduced your prescription pain medication. 4. Add heating pad or sitz baths as needed. Add hemorrhoid wipes or ointments if needed    Constipation  1. Constipation is normal after pregnancy and delivery, especially while taking prescription narcotic pain medication. 2. Over the counter remedies including ducosate (Colace), take 1-2 capsules 1-2 times daily for soft stool as needed. You may also add/ try milk of magnesia or rectal remedies such as Dulcolax or Fleets enema. Recovery: What to Expect at Home  1. Fatigue is expected. Try to rest when you can and don't worry about doing housework or other tasks which can wait. 2. The soreness along your bottom will improve significantly over the first 2 weeks, but it may take 6 weeks before you are completely recovered. 3. Back pain or general body aches or muscle soreness are expected and should improve with acetominophen or ibuprofen. 4. Leg swelling due to pregnancy and/or IV fluids given in the hospital will take about two weeks to resolve. 5. Most women experience some form of the \"Baby Blues\" after having a baby. Feeling emotional, tearful, frustrated, anxious, sad, and irritable some of the time is normal and go away after about 2 weeks. Adequate rest and help from your family will help. Take breaks from caring for the baby. Call your doctor if your symptoms seem severe, last more than 2 weeks, or seem to be getting worse instead of better. Get help immediately if you have thoughts of wanting to hurt yourself or others! Call your doctor or seek immediate medical care if you have:  Heavy vaginal bleeding, soaking through one or more pads an hour for several hours. Foul-smelling discharge from your vagina or incision.   Consistent nausea and vomiting and cannot keep fluids down. Consistent pain that does not get better after you take pain medicine.   Sudden chest pain and shortness of breath  Signs of a blood clot: pain/ swelling/ increasing redness in your lower extremeties  Signs of infection: increased pain in your abdomen or vaginal area; red streaks, warmth, or tenderness of your breasts; fever of 100.5 F or greater

## 2018-06-09 NOTE — PROGRESS NOTES
Post-Partum Day Number 2 Progress/Discharge Note    Patient doing well post-partum without significant complaint. Vitals:  Patient Vitals for the past 8 hrs:   BP Temp Pulse Resp   18 0609 105/59 97.8 °F (36.6 °C) (!) 51 15     Temp (24hrs), Av.9 °F (36.6 °C), Min:97.8 °F (36.6 °C), Max:98.1 °F (36.7 °C)      Vital signs stable, afebrile. Exam:  Patient without distress. Abdomen soft, fundus firm 1 f below umbilicus, non tender                              Lower extremities are negative for swelling, cords or tenderness. Lab/Data Review: All lab results for the last 24 hours reviewed. Assessment and Plan:  Patient appears to be having uncomplicated post-partum course. Continue routine perineal care and maternal education. Plan discharge for today with follow up in our office in 2 weeks. 2. Hx of anxiety, ?depression with 14 on EPDS--will increase her lexapro to 10 mg per day and f/u in office in 2 weeks or prn.

## 2019-12-02 LAB
ANTIBODY SCREEN, EXTERNAL: NEGATIVE
CHLAMYDIA, EXTERNAL: NEGATIVE
GRBS, EXTERNAL: POSITIVE
HBSAG, EXTERNAL: NEGATIVE
HIV, EXTERNAL: NEGATIVE
N. GONORRHEA, EXTERNAL: NEGATIVE
RPR, EXTERNAL: NEGATIVE
RUBELLA, EXTERNAL: NORMAL
TYPE, ABO & RH, EXTERNAL: NORMAL

## 2020-06-19 ENCOUNTER — HOSPITAL ENCOUNTER (OUTPATIENT)
Dept: PERINATAL CARE | Age: 35
Discharge: HOME | End: 2020-06-19
Attending: OBSTETRICS & GYNECOLOGY

## 2020-06-29 ENCOUNTER — HOSPITAL ENCOUNTER (EMERGENCY)
Age: 35
Discharge: HOME | End: 2020-06-29
Attending: OBSTETRICS & GYNECOLOGY | Admitting: OBSTETRICS & GYNECOLOGY
Payer: COMMERCIAL

## 2020-06-29 VITALS
TEMPERATURE: 98.3 F | RESPIRATION RATE: 16 BRPM | DIASTOLIC BLOOD PRESSURE: 68 MMHG | SYSTOLIC BLOOD PRESSURE: 109 MMHG | BODY MASS INDEX: 24.23 KG/M2 | WEIGHT: 150.8 LBS | HEART RATE: 80 BPM | HEIGHT: 66 IN

## 2020-06-29 LAB
ALBUMIN SERPL-MCNC: 3 G/DL (ref 3.5–5)
ALBUMIN/GLOB SERPL: 1.1 {RATIO} (ref 1.1–2.2)
ALP SERPL-CCNC: 104 U/L (ref 45–117)
ALT SERPL-CCNC: 21 U/L (ref 12–78)
ANION GAP SERPL CALC-SCNC: 10 MMOL/L (ref 5–15)
AST SERPL-CCNC: 19 U/L (ref 15–37)
BASOPHILS # BLD: 0 K/UL (ref 0–0.1)
BASOPHILS NFR BLD: 0 % (ref 0–1)
BILIRUB SERPL-MCNC: 0.7 MG/DL (ref 0.2–1)
BUN SERPL-MCNC: 9 MG/DL (ref 6–20)
BUN/CREAT SERPL: 16 (ref 12–20)
CALCIUM SERPL-MCNC: 8.5 MG/DL (ref 8.5–10.1)
CHLORIDE SERPL-SCNC: 108 MMOL/L (ref 97–108)
CO2 SERPL-SCNC: 21 MMOL/L (ref 21–32)
COVID-19 RAPID TEST, COVR: NOT DETECTED
CREAT SERPL-MCNC: 0.56 MG/DL (ref 0.55–1.02)
DIFFERENTIAL METHOD BLD: ABNORMAL
EOSINOPHIL # BLD: 0 K/UL (ref 0–0.4)
EOSINOPHIL NFR BLD: 1 % (ref 0–7)
ERYTHROCYTE [DISTWIDTH] IN BLOOD BY AUTOMATED COUNT: 13.2 % (ref 11.5–14.5)
GLOBULIN SER CALC-MCNC: 2.8 G/DL (ref 2–4)
GLUCOSE SERPL-MCNC: 98 MG/DL (ref 65–100)
HCT VFR BLD AUTO: 37.9 % (ref 35–47)
HGB BLD-MCNC: 13.4 G/DL (ref 11.5–16)
IMM GRANULOCYTES # BLD AUTO: 0 K/UL (ref 0–0.04)
IMM GRANULOCYTES NFR BLD AUTO: 0 % (ref 0–0.5)
LDH SERPL L TO P-CCNC: 176 U/L (ref 81–246)
LYMPHOCYTES # BLD: 1.4 K/UL (ref 0.8–3.5)
LYMPHOCYTES NFR BLD: 23 % (ref 12–49)
MCH RBC QN AUTO: 33.3 PG (ref 26–34)
MCHC RBC AUTO-ENTMCNC: 35.4 G/DL (ref 30–36.5)
MCV RBC AUTO: 94 FL (ref 80–99)
MONOCYTES # BLD: 0.4 K/UL (ref 0–1)
MONOCYTES NFR BLD: 6 % (ref 5–13)
NEUTS SEG # BLD: 4.1 K/UL (ref 1.8–8)
NEUTS SEG NFR BLD: 69 % (ref 32–75)
NRBC # BLD: 0 K/UL (ref 0–0.01)
NRBC BLD-RTO: 0 PER 100 WBC
PLATELET # BLD AUTO: 97 K/UL (ref 150–400)
PMV BLD AUTO: 10.7 FL (ref 8.9–12.9)
POTASSIUM SERPL-SCNC: 4 MMOL/L (ref 3.5–5.1)
PROT SERPL-MCNC: 5.8 G/DL (ref 6.4–8.2)
RBC # BLD AUTO: 4.03 M/UL (ref 3.8–5.2)
SARS-COV-2, COV2: NOT DETECTED
SODIUM SERPL-SCNC: 139 MMOL/L (ref 136–145)
SOURCE, COVRS: NORMAL
SPECIMEN SOURCE, FCOV2M: NORMAL
SPECIMEN SOURCE, FCOV2M: NORMAL
WBC # BLD AUTO: 5.9 K/UL (ref 3.6–11)

## 2020-06-29 PROCEDURE — 59025 FETAL NON-STRESS TEST: CPT

## 2020-06-29 PROCEDURE — 87635 SARS-COV-2 COVID-19 AMP PRB: CPT

## 2020-06-29 PROCEDURE — 36415 COLL VENOUS BLD VENIPUNCTURE: CPT

## 2020-06-29 PROCEDURE — 99282 EMERGENCY DEPT VISIT SF MDM: CPT

## 2020-06-29 PROCEDURE — 83615 LACTATE (LD) (LDH) ENZYME: CPT

## 2020-06-29 PROCEDURE — 85025 COMPLETE CBC W/AUTO DIFF WBC: CPT

## 2020-06-29 PROCEDURE — 80053 COMPREHEN METABOLIC PANEL: CPT

## 2020-06-29 NOTE — H&P
Patient seen in office today for routine visit. Plts noted to be 83. Had been seen by MFM and suspect gestational thrombocytopenia. Patient denies complaints, no bleeding. Some rare ctxs. Good FM. Sent for re-eval of labs and anesthesia consult, covid testing. Visit Vitals  /68 (BP 1 Location: Left arm, BP Patient Position: At rest)   Pulse 80   Temp 98.3 °F (36.8 °C)   Resp 16   Ht 5' 6\" (1.676 m)   Wt 68.4 kg (150 lb 12.8 oz)   BMI 24.34 kg/m²     Gen: NAD, resting in bed    Recent Results (from the past 12 hour(s))   CBC WITH AUTOMATED DIFF    Collection Time: 06/29/20 11:16 AM   Result Value Ref Range    WBC 5.9 3.6 - 11.0 K/uL    RBC 4.03 3.80 - 5.20 M/uL    HGB 13.4 11.5 - 16.0 g/dL    HCT 37.9 35.0 - 47.0 %    MCV 94.0 80.0 - 99.0 FL    MCH 33.3 26.0 - 34.0 PG    MCHC 35.4 30.0 - 36.5 g/dL    RDW 13.2 11.5 - 14.5 %    PLATELET 97 (L) 696 - 400 K/uL    MPV 10.7 8.9 - 12.9 FL    NRBC 0.0 0  WBC    ABSOLUTE NRBC 0.00 0.00 - 0.01 K/uL    NEUTROPHILS 69 32 - 75 %    LYMPHOCYTES 23 12 - 49 %    MONOCYTES 6 5 - 13 %    EOSINOPHILS 1 0 - 7 %    BASOPHILS 0 0 - 1 %    IMMATURE GRANULOCYTES 0 0.0 - 0.5 %    ABS. NEUTROPHILS 4.1 1.8 - 8.0 K/UL    ABS. LYMPHOCYTES 1.4 0.8 - 3.5 K/UL    ABS. MONOCYTES 0.4 0.0 - 1.0 K/UL    ABS. EOSINOPHILS 0.0 0.0 - 0.4 K/UL    ABS. BASOPHILS 0.0 0.0 - 0.1 K/UL    ABS. IMM.  GRANS. 0.0 0.00 - 0.04 K/UL    DF AUTOMATED     METABOLIC PANEL, COMPREHENSIVE    Collection Time: 06/29/20 11:16 AM   Result Value Ref Range    Sodium 139 136 - 145 mmol/L    Potassium 4.0 3.5 - 5.1 mmol/L    Chloride 108 97 - 108 mmol/L    CO2 21 21 - 32 mmol/L    Anion gap 10 5 - 15 mmol/L    Glucose 98 65 - 100 mg/dL    BUN 9 6 - 20 MG/DL    Creatinine 0.56 0.55 - 1.02 MG/DL    BUN/Creatinine ratio 16 12 - 20      GFR est AA >60 >60 ml/min/1.73m2    GFR est non-AA >60 >60 ml/min/1.73m2    Calcium 8.5 8.5 - 10.1 MG/DL    Bilirubin, total 0.7 0.2 - 1.0 MG/DL    ALT (SGPT) 21 12 - 78 U/L    AST (SGOT) 19 15 - 37 U/L    Alk.  phosphatase 104 45 - 117 U/L    Protein, total 5.8 (L) 6.4 - 8.2 g/dL    Albumin 3.0 (L) 3.5 - 5.0 g/dL    Globulin 2.8 2.0 - 4.0 g/dL    A-G Ratio 1.1 1.1 - 2.2     LD    Collection Time: 20 11:16 AM   Result Value Ref Range     81 - 246 U/L   SARS-COV-2    Collection Time: 20 11:24 AM   Result Value Ref Range    Specimen source Nasopharyngeal      Specimen source Nasopharyngeal      COVID-19 rapid test Not detected NOTD         A/p: 80KQ  @ 38wks with thrombocytopenia    - labs stable at this time  - will continue w plan for IOL at 39wks  - discussed case with anesthesia; will have to check at time before can consider epidural  - covid screening neg  - see Monday for routine visit    Danielito Yap MD  [facility]

## 2020-06-29 NOTE — DISCHARGE INSTRUCTIONS
Patient Education        Pregnancy Precautions: Care Instructions  Your Care Instructions     There is no sure way to prevent labor before your due date ( labor) or to prevent most other pregnancy problems. But there are things you can do to increase your chances of a healthy pregnancy. Go to your appointments, follow your doctor's advice, and take good care of yourself. Eat well, and exercise (if your doctor agrees). And make sure to drink plenty of water. Follow-up care is a key part of your treatment and safety. Be sure to make and go to all appointments, and call your doctor if you are having problems. It's also a good idea to know your test results and keep a list of the medicines you take. How can you care for yourself at home? · Make sure you go to your prenatal appointments. At each visit, your doctor will check your blood pressure. Your doctor will also check to see if you have protein in your urine. High blood pressure and protein in urine are signs of preeclampsia. This condition can be dangerous for you and your baby. · Drink plenty of fluids, enough so that your urine is light yellow or clear like water. Dehydration can cause contractions. If you have kidney, heart, or liver disease and have to limit fluids, talk with your doctor before you increase the amount of fluids you drink. · Tell your doctor right away if you notice any symptoms of an infection, such as:  ? Burning when you urinate. ? A foul-smelling discharge from your vagina. ? Vaginal itching. ? Unexplained fever. ? Unusual pain or soreness in your uterus or lower belly. · Eat a balanced diet. Include plenty of foods that are high in calcium and iron. ? Foods high in calcium include milk, cheese, yogurt, almonds, and broccoli. ? Foods high in iron include red meat, shellfish, poultry, eggs, beans, raisins, whole-grain bread, and leafy green vegetables. · Do not smoke.  If you need help quitting, talk to your doctor about stop-smoking programs and medicines. These can increase your chances of quitting for good. · Do not drink alcohol or use illegal drugs. · Follow your doctor's directions about activity. Your doctor will let you know how much, if any, exercise you can do. · Ask your doctor if you can have sex. If you are at risk for early labor, your doctor may ask you to not have sex. · Take care to prevent falls. During pregnancy, your joints are loose, and your balance is off. Sports such as bicycling, skiing, or in-line skating can increase your risk of falling. And don't ride horses or motorcycles, dive, water ski, scuba dive, or parachute jump while you are pregnant. · Avoid getting very hot. Do not use saunas or hot tubs. Avoid staying out in the sun in hot weather for long periods. Take acetaminophen (Tylenol) to lower a high fever. · Do not take any over-the-counter or herbal medicines or supplements without talking to your doctor or pharmacist first.  When should you call for help? Call 911 anytime you think you may need emergency care. For example, call if:  · You passed out (lost consciousness). · You have a seizure. · You have severe vaginal bleeding. · You have severe pain in your belly or pelvis. · You have had fluid gushing or leaking from your vagina and you know or think the umbilical cord is bulging into your vagina. If this happens, immediately get down on your knees so your rear end (buttocks) is higher than your head. This will decrease the pressure on the cord until help arrives. Call your doctor now or seek immediate medical care if:  · You have signs of preeclampsia, such as:  ? Sudden swelling of your face, hands, or feet. ? New vision problems (such as dimness, blurring, or seeing spots). ? A severe headache. · You have any vaginal bleeding. · You have belly pain or cramping. · You have a fever. · You have had regular contractions (with or without pain) for an hour.  This means that you have 8 or more within 1 hour or 4 or more in 20 minutes after you change your position and drink fluids. · You have a sudden release of fluid from your vagina. · You have low back pain or pelvic pressure that does not go away. · You notice that your baby has stopped moving or is moving much less than normal.  Watch closely for changes in your health, and be sure to contact your doctor if you have any problems. Where can you learn more? Go to http://www.leigh.com/  Enter Y951 in the search box to learn more about \"Pregnancy Precautions: Care Instructions. \"  Current as of: February 11, 2020               Content Version: 12.5  © 4828-7086 [employer], [employer]. Care instructions adapted under license by [employer] (which disclaims liability or warranty for this information). If you have questions about a medical condition or this instruction, always ask your healthcare professional. [address] any warranty or liability for your use of this information.

## 2020-06-29 NOTE — PROGRESS NOTES
1115 pt arrived from office for labs and consult with anesthesia. Spoke with Dr. Caryl Villarreal who states we will draw labs the morning of admission (scheduled induction 7/8/2020)  1200 Dr Isabel Mehta updated via phone of plt results of 97, covid negative status and anethesia desire to recheck labs on admission. Dr. Isabel Mehta to come to bedside to see pt. Pt off monitor and not feeling any uc's  1300 plan of care discussed with Dr. Isabel Mehta, pt  To be d/c with outpatient follow up. Discharge instructions given, pt given opportunity to ask questions, pt voices understanding, pt d/c to home ambulating without difficulty.

## 2020-07-07 ENCOUNTER — ANESTHESIA (OUTPATIENT)
Dept: LABOR AND DELIVERY | Age: 35
End: 2020-07-07
Payer: COMMERCIAL

## 2020-07-07 ENCOUNTER — HOSPITAL ENCOUNTER (INPATIENT)
Age: 35
LOS: 1 days | Discharge: HOME | End: 2020-07-08
Attending: OBSTETRICS & GYNECOLOGY | Admitting: OBSTETRICS & GYNECOLOGY
Payer: COMMERCIAL

## 2020-07-07 ENCOUNTER — ANESTHESIA EVENT (OUTPATIENT)
Dept: LABOR AND DELIVERY | Age: 35
End: 2020-07-07
Payer: COMMERCIAL

## 2020-07-07 PROBLEM — Z34.90 PREGNANT: Status: ACTIVE | Noted: 2020-07-07

## 2020-07-07 LAB
ERYTHROCYTE [DISTWIDTH] IN BLOOD BY AUTOMATED COUNT: 12.7 % (ref 11.5–14.5)
HCT VFR BLD AUTO: 38.2 % (ref 35–47)
HGB BLD-MCNC: 13.4 G/DL (ref 11.5–16)
MCH RBC QN AUTO: 33 PG (ref 26–34)
MCHC RBC AUTO-ENTMCNC: 35.1 G/DL (ref 30–36.5)
MCV RBC AUTO: 94.1 FL (ref 80–99)
NRBC # BLD: 0 K/UL (ref 0–0.01)
NRBC BLD-RTO: 0 PER 100 WBC
PLATELET # BLD AUTO: 98 K/UL (ref 150–400)
PMV BLD AUTO: 11.2 FL (ref 8.9–12.9)
RBC # BLD AUTO: 4.06 M/UL (ref 3.8–5.2)
WBC # BLD AUTO: 7.9 K/UL (ref 3.6–11)

## 2020-07-07 PROCEDURE — 74011250636 HC RX REV CODE- 250/636: Performed by: OBSTETRICS & GYNECOLOGY

## 2020-07-07 PROCEDURE — 85027 COMPLETE CBC AUTOMATED: CPT

## 2020-07-07 PROCEDURE — 77030014125 HC TY EPDRL BBMI -B: Performed by: ANESTHESIOLOGY

## 2020-07-07 PROCEDURE — 65270000029 HC RM PRIVATE

## 2020-07-07 PROCEDURE — 99285 EMERGENCY DEPT VISIT HI MDM: CPT

## 2020-07-07 PROCEDURE — 75810000275 HC EMERGENCY DEPT VISIT NO LEVEL OF CARE

## 2020-07-07 PROCEDURE — 74011250637 HC RX REV CODE- 250/637: Performed by: OBSTETRICS & GYNECOLOGY

## 2020-07-07 PROCEDURE — 77030018846 HC SOL IRR STRL H20 ICUM -A

## 2020-07-07 PROCEDURE — 74011250636 HC RX REV CODE- 250/636

## 2020-07-07 PROCEDURE — 75410000000 HC DELIVERY VAGINAL/SINGLE: Performed by: OBSTETRICS & GYNECOLOGY

## 2020-07-07 PROCEDURE — 75410000002 HC LABOR FEE PER 1 HR: Performed by: OBSTETRICS & GYNECOLOGY

## 2020-07-07 PROCEDURE — 77030005513 HC CATH URETH FOL11 MDII -B

## 2020-07-07 PROCEDURE — 36415 COLL VENOUS BLD VENIPUNCTURE: CPT

## 2020-07-07 PROCEDURE — 75410000003 HC RECOV DEL/VAG/CSECN EA 0.5 HR: Performed by: OBSTETRICS & GYNECOLOGY

## 2020-07-07 PROCEDURE — 74011000250 HC RX REV CODE- 250: Performed by: ANESTHESIOLOGY

## 2020-07-07 PROCEDURE — 74011000258 HC RX REV CODE- 258: Performed by: OBSTETRICS & GYNECOLOGY

## 2020-07-07 PROCEDURE — 74011250636 HC RX REV CODE- 250/636: Performed by: ANESTHESIOLOGY

## 2020-07-07 PROCEDURE — 76060000078 HC EPIDURAL ANESTHESIA: Performed by: ANESTHESIOLOGY

## 2020-07-07 PROCEDURE — 59025 FETAL NON-STRESS TEST: CPT

## 2020-07-07 RX ORDER — NALBUPHINE HYDROCHLORIDE 10 MG/ML
2.5 INJECTION, SOLUTION INTRAMUSCULAR; INTRAVENOUS; SUBCUTANEOUS
Status: DISCONTINUED | OUTPATIENT
Start: 2020-07-07 | End: 2020-07-07 | Stop reason: RX

## 2020-07-07 RX ORDER — BUPIVACAINE HYDROCHLORIDE 2.5 MG/ML
INJECTION, SOLUTION EPIDURAL; INFILTRATION; INTRACAUDAL AS NEEDED
Status: DISCONTINUED | OUTPATIENT
Start: 2020-07-07 | End: 2020-07-07 | Stop reason: HOSPADM

## 2020-07-07 RX ORDER — FENTANYL/BUPIVACAINE/NS/PF 2-1250MCG
1-16 PREFILLED PUMP RESERVOIR EPIDURAL CONTINUOUS
Status: DISCONTINUED | OUTPATIENT
Start: 2020-07-07 | End: 2020-07-07 | Stop reason: HOSPADM

## 2020-07-07 RX ORDER — OXYTOCIN/0.9 % SODIUM CHLORIDE 30/500 ML
PLASTIC BAG, INJECTION (ML) INTRAVENOUS
Status: COMPLETED
Start: 2020-07-07 | End: 2020-07-07

## 2020-07-07 RX ORDER — ONDANSETRON 2 MG/ML
4 INJECTION INTRAMUSCULAR; INTRAVENOUS
Status: DISCONTINUED | OUTPATIENT
Start: 2020-07-07 | End: 2020-07-07 | Stop reason: HOSPADM

## 2020-07-07 RX ORDER — PENICILLIN G POTASSIUM 5000000 [IU]/1
INJECTION, POWDER, FOR SOLUTION INTRAMUSCULAR; INTRAVENOUS
Status: DISPENSED
Start: 2020-07-07 | End: 2020-07-07

## 2020-07-07 RX ORDER — DOCUSATE SODIUM 100 MG/1
100 CAPSULE, LIQUID FILLED ORAL
Status: DISCONTINUED | OUTPATIENT
Start: 2020-07-07 | End: 2020-07-08 | Stop reason: HOSPADM

## 2020-07-07 RX ORDER — HYDROCORTISONE ACETATE PRAMOXINE HCL 2.5; 1 G/100G; G/100G
CREAM TOPICAL AS NEEDED
Status: DISCONTINUED | OUTPATIENT
Start: 2020-07-07 | End: 2020-07-08 | Stop reason: HOSPADM

## 2020-07-07 RX ORDER — NALOXONE HYDROCHLORIDE 0.4 MG/ML
0.4 INJECTION, SOLUTION INTRAMUSCULAR; INTRAVENOUS; SUBCUTANEOUS AS NEEDED
Status: DISCONTINUED | OUTPATIENT
Start: 2020-07-07 | End: 2020-07-08 | Stop reason: HOSPADM

## 2020-07-07 RX ORDER — SODIUM CHLORIDE 0.9 % (FLUSH) 0.9 %
5-40 SYRINGE (ML) INJECTION EVERY 8 HOURS
Status: DISCONTINUED | OUTPATIENT
Start: 2020-07-07 | End: 2020-07-08 | Stop reason: HOSPADM

## 2020-07-07 RX ORDER — IBUPROFEN 800 MG/1
800 TABLET ORAL EVERY 8 HOURS
Status: DISCONTINUED | OUTPATIENT
Start: 2020-07-07 | End: 2020-07-08 | Stop reason: HOSPADM

## 2020-07-07 RX ORDER — OXYCODONE AND ACETAMINOPHEN 5; 325 MG/1; MG/1
1 TABLET ORAL
Status: DISCONTINUED | OUTPATIENT
Start: 2020-07-07 | End: 2020-07-08 | Stop reason: HOSPADM

## 2020-07-07 RX ORDER — DIPHENHYDRAMINE HCL 25 MG
25 CAPSULE ORAL
Status: DISCONTINUED | OUTPATIENT
Start: 2020-07-07 | End: 2020-07-08 | Stop reason: HOSPADM

## 2020-07-07 RX ORDER — LIDOCAINE HYDROCHLORIDE 10 MG/ML
INJECTION INFILTRATION; PERINEURAL
Status: DISCONTINUED
Start: 2020-07-07 | End: 2020-07-07 | Stop reason: WASHOUT

## 2020-07-07 RX ORDER — SIMETHICONE 80 MG
80 TABLET,CHEWABLE ORAL
Status: DISCONTINUED | OUTPATIENT
Start: 2020-07-07 | End: 2020-07-08 | Stop reason: HOSPADM

## 2020-07-07 RX ORDER — NALOXONE HYDROCHLORIDE 0.4 MG/ML
0.4 INJECTION, SOLUTION INTRAMUSCULAR; INTRAVENOUS; SUBCUTANEOUS AS NEEDED
Status: DISCONTINUED | OUTPATIENT
Start: 2020-07-07 | End: 2020-07-07 | Stop reason: HOSPADM

## 2020-07-07 RX ORDER — OXYTOCIN/0.9 % SODIUM CHLORIDE 30/500 ML
2 PLASTIC BAG, INJECTION (ML) INTRAVENOUS
Status: DISCONTINUED | OUTPATIENT
Start: 2020-07-07 | End: 2020-07-08 | Stop reason: HOSPADM

## 2020-07-07 RX ORDER — OXYTOCIN/0.9 % SODIUM CHLORIDE 20/1000 ML
125-500 PLASTIC BAG, INJECTION (ML) INTRAVENOUS ONCE
Status: ACTIVE | OUTPATIENT
Start: 2020-07-07 | End: 2020-07-07

## 2020-07-07 RX ORDER — SODIUM CHLORIDE, SODIUM LACTATE, POTASSIUM CHLORIDE, CALCIUM CHLORIDE 600; 310; 30; 20 MG/100ML; MG/100ML; MG/100ML; MG/100ML
125 INJECTION, SOLUTION INTRAVENOUS CONTINUOUS
Status: DISCONTINUED | OUTPATIENT
Start: 2020-07-07 | End: 2020-07-08 | Stop reason: HOSPADM

## 2020-07-07 RX ORDER — LIDOCAINE HYDROCHLORIDE AND EPINEPHRINE 15; 5 MG/ML; UG/ML
INJECTION, SOLUTION EPIDURAL AS NEEDED
Status: DISCONTINUED | OUTPATIENT
Start: 2020-07-07 | End: 2020-07-07 | Stop reason: HOSPADM

## 2020-07-07 RX ORDER — SODIUM CHLORIDE 0.9 % (FLUSH) 0.9 %
5-40 SYRINGE (ML) INJECTION AS NEEDED
Status: DISCONTINUED | OUTPATIENT
Start: 2020-07-07 | End: 2020-07-08 | Stop reason: HOSPADM

## 2020-07-07 RX ORDER — ONDANSETRON 4 MG/1
4 TABLET, ORALLY DISINTEGRATING ORAL
Status: ACTIVE | OUTPATIENT
Start: 2020-07-07 | End: 2020-07-08

## 2020-07-07 RX ADMIN — BUPIVACAINE HYDROCHLORIDE 1 ML: 2.5 INJECTION, SOLUTION EPIDURAL; INFILTRATION; INTRACAUDAL; PERINEURAL at 04:23

## 2020-07-07 RX ADMIN — SODIUM CHLORIDE, SODIUM LACTATE, POTASSIUM CHLORIDE, AND CALCIUM CHLORIDE 1000 ML: 600; 310; 30; 20 INJECTION, SOLUTION INTRAVENOUS at 03:46

## 2020-07-07 RX ADMIN — SODIUM CHLORIDE 2.5 MILLION UNITS: 900 INJECTION, SOLUTION INTRAVENOUS at 07:17

## 2020-07-07 RX ADMIN — OXYTOCIN 2 MILLI-UNITS/MIN: 10 INJECTION, SOLUTION INTRAMUSCULAR; INTRAVENOUS at 08:02

## 2020-07-07 RX ADMIN — SODIUM CHLORIDE 5 MILLION UNITS: 900 INJECTION, SOLUTION INTRAVENOUS at 03:36

## 2020-07-07 RX ADMIN — SODIUM CHLORIDE, SODIUM LACTATE, POTASSIUM CHLORIDE, AND CALCIUM CHLORIDE 1000 ML: 600; 310; 30; 20 INJECTION, SOLUTION INTRAVENOUS at 03:35

## 2020-07-07 RX ADMIN — Medication 2 MILLI-UNITS/MIN: at 08:02

## 2020-07-07 RX ADMIN — SODIUM CHLORIDE, SODIUM LACTATE, POTASSIUM CHLORIDE, AND CALCIUM CHLORIDE 1000 ML: 600; 310; 30; 20 INJECTION, SOLUTION INTRAVENOUS at 04:25

## 2020-07-07 RX ADMIN — IBUPROFEN 800 MG: 800 TABLET ORAL at 18:47

## 2020-07-07 RX ADMIN — OXYCODONE HYDROCHLORIDE AND ACETAMINOPHEN 1 TABLET: 5; 325 TABLET ORAL at 14:07

## 2020-07-07 RX ADMIN — OXYCODONE HYDROCHLORIDE AND ACETAMINOPHEN 1 TABLET: 5; 325 TABLET ORAL at 20:30

## 2020-07-07 RX ADMIN — LIDOCAINE HYDROCHLORIDE AND EPINEPHRINE 5 ML: 15; 5 INJECTION, SOLUTION EPIDURAL at 04:27

## 2020-07-07 RX ADMIN — IBUPROFEN 800 MG: 800 TABLET ORAL at 10:52

## 2020-07-07 RX ADMIN — Medication 12 ML/HR: at 05:08

## 2020-07-07 NOTE — DISCHARGE SUMMARY
Obstetrical Discharge Summary     Name: Elisa Moctezuma MRN: [id number]  SSN: [id number]    YOB: 1985  Age: 30 y.o. Sex: female      Admit Date: 7/7/2020    Discharge Date: 7/8/20    Attending Physician:  Theodora Duffy MD     Delivering Physician:  Ruben Mcdaniel MD     * Admission Diagnoses:   IUP @ 39w1d      * Discharge Diagnoses:   Delivery of a VMI via TSVD by Vanita Singh MD on 7/7/2020. Apgars were 9 and 9. No lacerations      Additional Diagnoses:   Hospital Problems as of 7/7/2020 Date Reviewed: 6/7/2018          Codes Class Noted - Resolved POA    Pregnant ICD-10-CM: Z34.90  ICD-9-CM: V22.2  7/7/2020 - Present Unknown             Lab Results   Component Value Date/Time    Rubella, External Immune 12/02/2019    GrBStrep, External Positive 12/02/2019      Immunization History   Administered Date(s) Administered    MMR 06/09/2018    Tdap 06/28/2015       * Procedures:   TSVD         * Discharge Condition: good    * Hospital Course: Normal hospital course following the delivery. * Disposition: Home    Discharge Medications:   Current Discharge Medication List          * Follow-up Care/Patient Instructions:   Activity: Activity as tolerated  Diet: Regular Diet  Wound Care: As directed      Followup 6 weeks for PP check        Signed By:  Mandeep Werner MD     July 7, 2020

## 2020-07-07 NOTE — H&P
Labor and Delivery Admission Note  2020    30 y.o., , female, G5 P 3013 Estimated Date of Delivery: 20 by dates and US presents at 44 1/7 with contractions. Pt states that her membranes were stripped in the office today and she had been tigre ever since. She has had bloody show and good FM. No LOF.   She arrives to L and D at 0300  Reports no fever, cough, N/V/D.    PNC: Blood type: O            RH: pos            Rubella: immune            SVII serology: NR             GBS status: positive  POB:  SAB X1   X3, largest 7#1, no complications  G5- current, no complications    Pgyn:  Denies    Past Medical History:   Diagnosis Date    Psychiatric problem     Taking medications Lexapro    Urinary frequency      Past Surgical History:   Procedure Laterality Date    HX APPENDECTOMY          HX HEENT      wisdom teeth    HX OTHER SURGICAL      psoriatic arthritis    HX OTHER SURGICAL      mild colitis    HX OTHER SURGICAL  2006    appendectomy    HX WISDOM TEETH EXTRACTION Bilateral      OB/GYN: [facility]    Meds:   Current Facility-Administered Medications   Medication Dose Route Frequency    ondansetron (ZOFRAN) injection 4 mg  4 mg IntraVENous Q4H PRN    naloxone (NARCAN) injection 0.4 mg  0.4 mg IntraVENous PRN    penicillin G pot (PFIZERPEN) 2.5 Million Units in 50 ml 0.9% NaCl  2.5 Million Units IntraVENous Q4H    penicillin G potassium (PFIZERPEN) 5 million unit injection        oxytocin (PITOCIN) 30 units/500 ml NS  2 ruma-units/min IntraVENous TITRATE    lidocaine (XYLOCAINE) 10 mg/mL (1 %) injection        oxytocin in normal saline (PITOCIN) 30 unit/500 mL infusion        lactated ringers bolus infusion 1,000 mL  1,000 mL IntraVENous ONCE    lactated ringers bolus infusion 500 mL  500 mL IntraVENous ONCE    fentaNYL 2mcg/mL - bupivacaine 0.125% pf epidural  1-16 mL/hr Epidural CONTINUOUS     Facility-Administered Medications Ordered in Other Encounters   Medication Dose Route Frequency    bupivacaine (PF) (MARCAINE) 0.25 % (2.5 mg/mL) injection   Epidural PRN    lidocaine-EPINEPHrine (XYLOCAINE) 1.5 %-1:200,000 injection   Epidural PRN   Home Medications  PNV  Lexapro      Allergies: No Known Allergies     Pertinent ROS: see HPI, otherwise noncontributory     Family History   Problem Relation Age of Onset    Hypertension Father      Social History     Socioeconomic History    Marital status:      Spouse name: Not on file    Number of children: Not on file    Years of education: Not on file    Highest education level: Not on file   Occupational History    Not on file   Social Needs    Financial resource strain: Not on file    Food insecurity     Worry: Not on file     Inability: Not on file    Transportation needs     Medical: Not on file     Non-medical: Not on file   Tobacco Use    Smoking status: Never Smoker    Smokeless tobacco: Never Used   Substance and Sexual Activity    Alcohol use: No    Drug use: No    Sexual activity: Yes     Partners: Male     Birth control/protection: None   Lifestyle    Physical activity     Days per week: Not on file     Minutes per session: Not on file    Stress: Not on file   Relationships    Social connections     Talks on phone: Not on file     Gets together: Not on file     Attends [employer] service: Not on file     Active member of club or organization: Not on file     Attends meetings of clubs or organizations: Not on file     Relationship status: Not on file    Intimate partner violence     Fear of current or ex partner: Not on file     Emotionally abused: Not on file     Physically abused: Not on file     Forced sexual activity: Not on file   Other Topics Concern     Service Not Asked    Blood Transfusions Not Asked    Caffeine Concern Not Asked    Occupational Exposure Not Asked   [place]ig [place] Hazards Not Asked    Sleep Concern Not Asked    Stress Concern Not Asked    Weight Concern Not Asked    Special Diet Not Asked    Back Care Not Asked    Exercise Not Asked    Bike Helmet Not Asked    [address] Not Asked    Self-Exams Not Asked   Social History Narrative    Not on file       OBJECTIVE:  Gravid , female NAD  No data recorded. Visit Vitals  /54   Pulse 73   Ht 5' 6\" (1.676 m)   Wt 68.9 kg (152 lb)   SpO2 99%   BMI 24.53 kg/m²       Labs:    Lab Results   Component Value Date/Time    WBC 7.9 2020 03:24 AM       Exam:  HEENT:  normal   Lungs:  clear  Cor:  RRR  Abdomen:  Fundal height c/w GA                    Soft between UC                    Clinical EFW 7#  Fetal heart rate tracing:  , moderate variability, +accels, no decels, Cat I  Contraction pattern: Q3-4  Cervix:  8/80/-2 after epidural , per RN exam  Fluid:  intact  Pelvimetry:  AP-good                      Arch- adequate                      Sidewalls- adequate                      Pelvis feels adequate for fetus. Impression:  IUP at 39 1/7 weeks in active labor.   GBS +, comfortable with epidural.    Plan: PCN,  Anticipate                 Mray Schofield MD

## 2020-07-07 NOTE — PROGRESS NOTES
Labor Note    Ritta Loser  [id number]  1985   39w1d      S:  Feeling comfortable with epidural.    O:    Visit Vitals  BP 97/53   Pulse 67   Temp 98.5 °F (36.9 °C)   Resp 16   Ht 5' 6\" (1.676 m)   Wt 68.9 kg (152 lb)   SpO2 97%   BMI 24.53 kg/m²     9/c/0  arom of clear fluid    Patient Vitals for the past 4 hrs: Mode Fetal Heart Rate Variability Decelerations Accelerations RN Reviewed Strip?   20 0700 External 130 6-25 BPM Variable No Yes   20 0645 External 130 6-25 BPM None Yes Yes   20 0630 External 125 6-25 BPM Variable Yes Yes   20 0615 External 125 6-25 BPM Variable Yes    20 0600 External 125 6-25 BPM Early Yes Yes   20 0545 External 125 6-25 BPM None No Yes   20 0530 External 125 6-25 BPM None Yes Yes   20 0516 External 130 6-25 BPM Variable Yes Yes   20 0500 External 130 6-25 BPM None Yes Yes   20 0445 External 130 6-25 BPM None Yes Yes   20 0430 External 130 6-25 BPM None Yes Yes   20 0400 External 130 6-25 BPM None Yes Yes   20 0330 External 130 6-25 BPM None  Yes       A/P:  30 y.o.   @ 39w1d- labor     - anticipate vaginal delivery     Edis Randall MD  [facility]

## 2020-07-07 NOTE — PROGRESS NOTES
7/7/2020  1:25 PM    CM met with MOB and FOB in room to complete assessment and begin discharge planning. MOB confirmed and verified demographics. MOB lives with spouse/FONicolasa Kumari ([phone]) along with their 10,8, and 2 yr old at the address on file. MOB does not work and will be home with baby. FOB is employed through [employer] and will be taking about 8wks off from work. MOB/FOB report they have good family support. MOB plans to breast/bottle feed baby and has pump to use at home. MOB has all necessary supplies for baby: car seat, bassinet/crib, clothing bottles. [facility] plans to [facility] up with [facility]. DOM has [employer] through [employer] and confirmed baby will be added to this policy. MOB denied needing WIC/Medicaid services at this time. Care Management Interventions  PCP Verified by CM: Yes([facility])  Mode of Transport at Discharge:  Other (see comment)  Transition of Care Consult (CM Consult): Discharge Planning  Current Support Network: Lives with Spouse, Family Lives [place], Own Home  Confirm Follow Up Transport: Family  Discharge Location  Discharge Placement: Home with family assistance  Elier Grijalva

## 2020-07-07 NOTE — ANESTHESIA PREPROCEDURE EVALUATION
Relevant Problems   No relevant active problems       Anesthetic History   No history of anesthetic complications            Review of Systems / Medical History  Patient summary reviewed and pertinent labs reviewed    Pulmonary  Within defined limits                 Neuro/Psych   Within defined limits           Cardiovascular                  Exercise tolerance: >4 METS     GI/Hepatic/Renal  Within defined limits              Endo/Other            Comments: Thrombocytopenia. Psoriatic Arthropathy Other Findings   Comments: 3 previous SVDs with Epidural analgesia.          Physical Exam    Airway  Mallampati: II  TM Distance: 4 - 6 cm  Neck ROM: normal range of motion   Mouth opening: Normal     Cardiovascular    Rhythm: regular  Rate: normal         Dental  No notable dental hx       Pulmonary  Breath sounds clear to auscultation               Abdominal  GI exam deferred       Other Findings            Anesthetic Plan    ASA: 2  Anesthesia type: CSE            Anesthetic plan and risks discussed with: Patient

## 2020-07-07 NOTE — PROGRESS NOTES
0715: Bedside and Verbal shift change report given to JUDY Easley RN (oncoming nurse) by SARA Álvarez (offgoing nurse). Report included the following information SBAR, Procedure Summary, Intake/Output, MAR and Recent Results. 2639: Pt resting with eyes closed at this time. 8615: Dr. Cecille Martinez in pt room at bedside discussing plan of care. 3483: Dr. Vasques Hand pt at this time for moderate clear fluid  0743: Dr. Theresa Tamayo performed cervical exam; 10/100/0; will start pushing with pt.    0813: Delivery QBL 100ml    1156: Report given JS Vo at this time in [place] format.

## 2020-07-07 NOTE — DISCHARGE INSTRUCTIONS
Discharge Instructions for Vaginal Delivery    Patient ID:  Tamra Nieto  [id number]  30 y.o.  1985    Take Home Medications       Continue taking your prenatal vitamins if you are breastfeeding. Follow-up care is a key part of your treatment and safety. Please schedule and keep appointments. Follow-up with your primary OB in 6 weeks. Activity  Avoid anything in your vagina for 6 weeks (no intercourse, tampons, or douching). You may drive unless you are taking prescription pain medications. Climbing stairs and light lifting are okay. Please avoid excessive exercise, though walking is okay- you'll be tired! Diet  Regular diet as tolerated. Be sure to drink plenty of fluids if you are breastfeeding. Wound care  If you have stitches, continue to rinse with a squirt bottle of warm water each time you void for about 7-10 days. .  Your stitches will gradually dissolve over four to eight weeks. Sitz baths are also helpful to keep the wound clean, encourage healing, and to help with pain associated with the stitches or hemorrhoids. You can use either a sitz bath basin or a bathtub filled with 2-3\" inches of plain warm water. Soak for 10 minutes 3 times a day as tolerated. Pain Management  1. Over the counter medications such as Tylenol and ibuprofen (Motrin or Advil) are ideal.  These may be taken together, alternating doses. You may  take the maximum dose:  Motrin or Advil (generic ibuprofen), either 3 tablets every 6 hours or 4 tablets every 8 hours or Tylenol (acetominophen) 1000mg every 6 hours (equivalent to 2 extra strength Tylenol). 2. You may also have a precrescription for stronger pain medication. Take only as needed and transition to over the counter medication in the next few days. Minimize amounts of the prescription medication, as it can be habit-forming and will worsen or cause constipation.  Most patients will find that within a couple of days, their pain is adequately controlled using only over-the-counter medications. 3. The prescription pain medication is mixed with Tylenol, therefore, you should not take any extra Tylenol or acetaminophen until you have reduced your prescription pain medication. 4. Add heating pad or sitz baths as needed. Add hemorrhoid wipes or ointments if needed    Constipation  1. Constipation is normal after pregnancy and delivery, especially while taking prescription narcotic pain medication. 2. Over the counter remedies including ducosate (Colace), take 1-2 capsules 1-2 times daily for soft stool as needed. You may also add/ try milk of magnesia or rectal remedies such as Dulcolax or Fleets enema. Recovery: What to Expect at Home  1. Fatigue is expected. Try to rest when you can and don't worry about doing housework or other tasks which can wait. 2. The soreness along your bottom will improve significantly over the first 2 weeks, but it may take 6 weeks before you are completely recovered. 3. Back pain or general body aches or muscle soreness are expected and should improve with acetominophen or ibuprofen. 4. Leg swelling due to pregnancy and/or IV fluids given in the hospital will take about two weeks to resolve. 5. Most women experience some form of the \"Baby Blues\" after having a baby. Feeling emotional, tearful, frustrated, anxious, sad, and irritable some of the time is normal and go away after about 2 weeks. Adequate rest and help from your family will help. Take breaks from caring for the baby. Call your doctor if your symptoms seem severe, last more than 2 weeks, or seem to be getting worse instead of better. Get help immediately if you have thoughts of wanting to hurt yourself or others! Call your doctor or seek immediate medical care if you have:  Heavy vaginal bleeding, soaking through one or more pads an hour for several hours. Foul-smelling discharge from your vagina or incision.   Consistent nausea and vomiting and cannot keep fluids down. Consistent pain that does not get better after you take pain medicine.   Sudden chest pain and shortness of breath  Signs of a blood clot: pain/ swelling/ increasing redness in your lower extremeties  Signs of infection: increased pain in your abdomen or vaginal area; red streaks, warmth, or tenderness of your breasts; fever of 100.5 F or greater

## 2020-07-07 NOTE — PROGRESS NOTES
Bedside shift change report given to Ronald Navarro RN (oncoming nurse) by Betzy Rogers RN (offgoing nurse). Report included the following information SBAR, Kardex, Intake/Output and MAR.

## 2020-07-07 NOTE — ANESTHESIA PROCEDURE NOTES
CSE Block    Start time: 7/7/2020 4:10 AM  End time: 7/7/2020 4:31 AM  Performed by: Jenae Thomas MD  Authorized by: Jenae Thomas MD     Pre-Procedure  Indications: procedure for pain    preanesthetic checklist: patient identified, risks and benefits discussed, anesthesia consent, site marked, patient being monitored and timeout performed    Timeout Time: 04:12        Procedure:   Patient Position:  Seated  Prep Region:  Lumbar  Prep: chlorhexidine    Location:  L2-3    Epidural Needle:   Needle Type:  Tuohy  Needle Gauge:  17 G  Injection Technique:  Loss of resistance using air  Attempts:  1    Spinal Needle:   Needle Type:  Pencil-tip  Needle Gauge:  25 G    Catheter:   Catheter Type:  Standard  Catheter Size:  20 G  Catheter at Skin Depth (cm):  9  Depth in Epidural Space (cm):  5  Events: CSF confirmed      Assessment:   Catheter Secured:  Tegaderm and tape  Insertion:  Uncomplicated  Patient tolerance:  Patient tolerated the procedure well with no immediate complications

## 2020-07-07 NOTE — LACTATION NOTE
This note was copied from a baby's chart. Reviewed breastfeeding basics:  Supply and demand,  stomach size, early  Feeding cues, skin to skin, positioning and baby led latch-on, assymetrical latch with signs of good, deep latch vs shallow, feeding frequency and duration, and log sheet for tracking infant feedings and output. Breastfeeding Booklet and Warm line information given. Discussed typical  weight loss and the importance of infant weight checks with pediatrician 1-2 post discharge. Hand Expression Education:  Mom comfortable  manually hand expressing her colostrum. Emphasized the importance of providing infant with valuable colostrum as infant rests skin to skin at breast.  Aware to avoid extended periods of non-feeding. Aware to offer 10-20+ drops of colostrum every 2-3 hours until infant is latching and nursing effectively. Taught the rationale behind this low tech but highly effective evidence based practice. States she is getting drops. Discussed with mother her plan for feeding. Reviewed the benefits of exclusive breast milk feeding during the hospital stay. Informed her of the risks of using formula to supplement in the first few days of life as well as the benefits of successful breast milk feeding; referred her to the Breastfeeding booklet about this information. She acknowledges understanding of information reviewed and states that it is her plan to both breast and formula feed her infant. Will support her choice and offer additional information as needed. Pt will successfully establish breastfeeding by feeding in response to early feeding cues   or wake every 3h, will obtain deep latch, and will keep log of feedings/output. Taught to BF at hunger cues and or q 2-3 hrs and to offer 10-20 drops of hand expressed colostrum at any non-feeds.       Breast Assessment  Left Breast: Large  Left Nipple: Everted, Short, Intact(used nipple shield with last)  Right Breast: Large  Right Nipple: Everted, Short, Intact  Breast- Feeding Assessment  Attends Breast-Feeding Classes: No  Breast-Feeding Experience: Yes(up to a year with several nursed last 3)  Breast Trauma/Surgery: No  Type/Quality: Good(per mom, not seen at breast)

## 2020-07-07 NOTE — PROGRESS NOTES
0300  Patient arrives to L&D reporting contractions getting stronger and closer together since last night. Patient also states that she had her membranes swept at her appointment on 7/6. Patient has been been oriented to room and unit, and has changed into gown. EFM and TOCO placed. 2681  5/100/-2  S Ashu Vargas RN is updating Dr. Damaris Vidales. 0330  Consent forms signed. Patient is desiring an epidural at this time. Fluid bolus started. GBS positive, PNC foist dose started. 6023  Dr Harrison Chiu notified of patient ready for epidural.  0407  Dr Harrison Chiu at bedside assessing and educationg the patient about the epidural.  0409  Time Out  0421  Loading Dose. 0440  SVE 8/100/-2  Dr Damaris Vidales updated via phone of patient assessment. Patient repositioned onto lateral right with peanut ball.  0504  Dr Damaris Vidales at bedside assessing patient progress, and is educating the patient about the plan of treatment. 8715  Cabrera catheter placed. Patient repositioned on right side with peanut ball between the ankles. 0715  Bedside and Verbal shift change report given to Stephie Grayson 694 (oncoming nurse) by Rafaela Loo RN (offgoing nurse). Report included the following information SBAR, Kardex, Procedure Summary, Intake/Output, MAR, Accordion, Recent Results and Med Rec Status.

## 2020-07-07 NOTE — PROGRESS NOTES
Problem: Patient Education: Go to Patient Education Activity  Goal: Patient/Family Education  Outcome: Progressing Towards Goal     Problem: Vaginal Delivery: Day of Deliver-Laboring  Goal: Off Pathway (Use only if patient is Off Pathway)  Outcome: Progressing Towards Goal     Problem: Vaginal Delivery: Day of Deliver-Laboring  Goal: Activity/Safety  Outcome: Progressing Towards Goal     Problem: Vaginal Delivery: Day of Deliver-Laboring  Goal: Consults, if ordered  Outcome: Progressing Towards Goal     Problem: Vaginal Delivery: Day of Deliver-Laboring  Goal: Diagnostic Test/Procedures  Outcome: Progressing Towards Goal     Problem: Vaginal Delivery: Day of Deliver-Laboring  Goal: Nutrition/Diet  Outcome: Progressing Towards Goal     Problem: Vaginal Delivery: Day of Deliver-Laboring  Goal: Discharge Planning  Outcome: Progressing Towards Goal     Problem: Vaginal Delivery: Day of Deliver-Laboring  Goal: Medications  Outcome: Progressing Towards Goal     Problem: Vaginal Delivery: Day of Deliver-Laboring  Goal: *Optimal pain control at patient's stated goal  Outcome: Progressing Towards Goal     Problem: Vaginal Delivery: Day of Deliver-Laboring  Goal: *Hemodynamically stable  Outcome: Progressing Towards Goal     Problem: Vaginal Delivery: Day of Deliver-Laboring  Goal: *Labs within defined limits  Outcome: Progressing Towards Goal     Problem: Vaginal Delivery: Day of Deliver-Laboring  Goal: *Vital signs within defined limits  Outcome: Progressing Towards Goal     Problem: Vaginal Delivery: Day of Deliver-Laboring  Goal: Treatments/Interventions/Procedures  Outcome: Progressing Towards Goal

## 2020-07-07 NOTE — L&D DELIVERY NOTE
Delivery Note:     Patient reached FD and pushed with good effort to deliver the fetal head in VICKIE position. No nuchal cord found. The anterior shoulder, followed by the posterior shoulder and the rest of the body then delivered easily. This was a LBMI with Apgars of 9 and 9 at 1 and 5 minutes respectively, weight pending. The infant was placed on mom's abdomen. The cord was then double clamped and cut by the FOB. Cord blood was taken. The placenta followed spontaneously, intact, with 3VC. Pitocin was added to the IVF and the fundus was firm to palpation. The vagina, cervix and perineum were examined and no laceration was found.  mL. No complications. Mom and baby doing well. Dr. Alicia Parks delivering.      Tone West MD  [facility]

## 2020-07-07 NOTE — PROGRESS NOTES
SBAR IN Report: Mother    Verbal report received from Demarcus Caruso RN (full name & credentials) on this patient, who is now being transferred from L and D (unit) for routine progression of care. The patient is not wearing a green \"Anesthesia-Duramorph\" band. Report consisted of patient's Situation, Background, Assessment and Recommendations (SBAR). [place] ID bands were compared with the identification form, and verified with the patient and transferring nurse. Information from the SBAR, Kardex, Intake/Output and MAR and the Christen Report was reviewed with the transferring nurse; opportunity for questions and clarification provided.

## 2020-07-08 VITALS
OXYGEN SATURATION: 98 % | DIASTOLIC BLOOD PRESSURE: 60 MMHG | RESPIRATION RATE: 16 BRPM | TEMPERATURE: 97.7 F | HEIGHT: 66 IN | HEART RATE: 47 BPM | WEIGHT: 152 LBS | SYSTOLIC BLOOD PRESSURE: 100 MMHG | BODY MASS INDEX: 24.43 KG/M2

## 2020-07-08 PROCEDURE — 74011250637 HC RX REV CODE- 250/637: Performed by: OBSTETRICS & GYNECOLOGY

## 2020-07-08 RX ADMIN — IBUPROFEN 800 MG: 800 TABLET ORAL at 13:44

## 2020-07-08 RX ADMIN — IBUPROFEN 800 MG: 800 TABLET ORAL at 05:16

## 2020-07-08 RX ADMIN — OXYCODONE HYDROCHLORIDE AND ACETAMINOPHEN 1 TABLET: 5; 325 TABLET ORAL at 10:09

## 2020-07-08 RX ADMIN — OXYCODONE HYDROCHLORIDE AND ACETAMINOPHEN 1 TABLET: 5; 325 TABLET ORAL at 00:53

## 2020-07-08 RX ADMIN — OXYCODONE HYDROCHLORIDE AND ACETAMINOPHEN 1 TABLET: 5; 325 TABLET ORAL at 13:45

## 2020-07-08 RX ADMIN — OXYCODONE HYDROCHLORIDE AND ACETAMINOPHEN 1 TABLET: 5; 325 TABLET ORAL at 05:16

## 2020-07-08 RX ADMIN — DOCUSATE SODIUM 100 MG: 100 CAPSULE, LIQUID FILLED ORAL at 13:44

## 2020-07-08 NOTE — LACTATION NOTE
This note was copied from a baby's chart. Mother states she would like to start pumping. Mother does both breast and pumped EBM as her feeding plan and has done so with her last 3 children. Pump set up and reviewed along with cleaning instructions from the [facility] and proper cleaning supplies. Baby just back to room from circ. Mother attempted to BF baby. Baby latched and took a few sucks then fell asleep. Discharge info reviewed. Chart shows numerous feedings, void, stool WNL. Discussed importance of monitoring outputs and feedings on first week of life. Discussed ways to tell if baby is  getting enough breast milk, ie  voids and stools, change in color of stool, and return to birth wt within 2 weeks. Follow up with pediatrician visit for weight check in 1-2 days (per AAP guidelines.)  Encouraged to call Warm Line  [phone]  for any questions/problems that arise. Mother also given breastfeeding support group dates and times for any future needs    Pt will successfully establish breastfeeding by feeding in response to early feeding cues or wake every 3h, will obtain deep latch, and will keep log of feedings/output. Taught to BF at hunger cues and or q 2-3 hrs and to offer 10-20 drops of hand expressed colostrum at any non-feeds.       Breast Assessment  Left Breast: Medium  Left Nipple: Everted, Intact  Right Breast: Medium  Right Nipple: Everted, Intact  Breast- Feeding Assessment  Attends Breast-Feeding Classes: No  Breast-Feeding Experience: Yes(up to a year with several nursed last 3)  Breast Trauma/Surgery: No  Type/Quality: Good(per mom, not seen at breast)  Lactation Consultant Visits  Breast-Feedings: Good   Mother/Infant Observation  Mother Observation: Breast comfortable, Close hold, Recognizes feeding cues  Infant Observation: Rhythmic suck, Relaxed after feeding, Opens mouth, Lips flanged, upper, Lips flanged, lower, Latches nipple and aereolae, Feeding cues  LATCH Documentation  Latch: Repeated attempts, hold nipple in mouth, stimulate to suck  Audible Swallowing: A few with stimulation  Type of Nipple: Everted (after stimulation)  Comfort (Breast/Nipple): Soft/non-tender  Hold (Positioning): No assist from staff, mother able to position/hold infant  LATCH Score: 8

## 2020-07-08 NOTE — ROUTINE PROCESS
Bedside and Verbal shift change report given to Garth Vidales RN (oncoming nurse) by Thomas Phillips RN (offgoing nurse). Report included the following information SBAR, Intake/Output, MAR and Recent Results.

## 2020-07-08 NOTE — PROGRESS NOTES
PostPartum Note    Golden Davidson  [id number]  1985  30 y.o.    S:  Ms. Golden Davidson is a 30 y.o.  PPD #1 s/p  @ 39w1d. Doing well. She had a baby boy. Her lochia is like a period. She describes her pain as mild and is well controlled with PO medications. She is breast feeding and this is going well. She is ambulating and voiding. Tolerating PO intake. O:   Visit Vitals  /60   Pulse (!) 47   Temp 97.7 °F (36.5 °C)   Resp 16   Ht 5' 6\" (1.676 m)   Wt 68.9 kg (152 lb)   SpO2 98%   Breastfeeding Unknown   BMI 24.53 kg/m²       Lab Results   Component Value Date/Time    WBC 7.9 2020 03:24 AM    HGB 13.4 2020 03:24 AM    HCT 38.2 2020 03:24 AM    PLATELET 98 (L)  03:24 AM    MCV 94.1 2020 03:24 AM    Hgb, External 13.7 10/25/2017 08:00 AM    Hct, External 36.1 2014    Platelet cnt., External 99 06/10/2015       Gen - No acute distress  Abdomen - Fundus firm, below the umbilicus   Ext - Warm, well perfused. Nontender    A/P:  PPD #1 s/p  @ 39w1d doing well. 1.  Routine PP instructions/ care discussed  2. Blood type - Rh +  3. Rubella imm  4. Circumcision desired - done  5. Discharge today as is meeting criteria   6. F/U 4-6 weeks for PP check.       Augustus Boo MD  [facility]

## 2022-03-19 PROBLEM — Z3A.39 PREGNANCY WITH 39 COMPLETED WEEKS GESTATION: Status: ACTIVE | Noted: 2018-06-07

## 2022-03-19 PROBLEM — Z34.90 PREGNANT: Status: ACTIVE | Noted: 2020-07-07

## 2023-01-30 ENCOUNTER — TRANSCRIBE ORDER (OUTPATIENT)
Dept: SCHEDULING | Age: 38
End: 2023-01-30

## 2023-01-30 DIAGNOSIS — S09.93XA UNSPECIFIED INJURY OF FACE, INITIAL ENCOUNTER: Primary | ICD-10-CM

## 2023-01-30 DIAGNOSIS — S19.9XXA UNSPECIFIED INJURY OF NECK, INITIAL ENCOUNTER: Primary | ICD-10-CM

## 2023-02-01 ENCOUNTER — TRANSCRIBE ORDER (OUTPATIENT)
Dept: SCHEDULING | Age: 38
End: 2023-02-01

## 2023-02-01 ENCOUNTER — HOSPITAL ENCOUNTER (OUTPATIENT)
Dept: CT IMAGING | Age: 38
Discharge: HOME | End: 2023-02-01
Attending: FAMILY MEDICINE
Payer: COMMERCIAL

## 2023-02-01 DIAGNOSIS — S09.93XA INJURY OF EYEBROW: Primary | ICD-10-CM

## 2023-02-01 DIAGNOSIS — S09.93XA INJURY OF EYEBROW: ICD-10-CM

## 2023-02-01 PROCEDURE — 70486 CT MAXILLOFACIAL W/O DYE: CPT

## 2023-04-22 DIAGNOSIS — S09.93XA UNSPECIFIED INJURY OF FACE, INITIAL ENCOUNTER: Primary | ICD-10-CM

## 2023-04-22 DIAGNOSIS — S19.9XXA UNSPECIFIED INJURY OF NECK, INITIAL ENCOUNTER: Primary | ICD-10-CM

## 2023-04-23 DIAGNOSIS — S19.9XXA UNSPECIFIED INJURY OF NECK, INITIAL ENCOUNTER: Primary | ICD-10-CM

## 2023-11-07 ENCOUNTER — APPOINTMENT (OUTPATIENT)
Facility: HOSPITAL | Age: 38
End: 2023-11-07
Payer: COMMERCIAL

## 2023-11-07 ENCOUNTER — HOSPITAL ENCOUNTER (EMERGENCY)
Facility: HOSPITAL | Age: 38
Discharge: HOME | End: 2023-11-07
Attending: STUDENT IN AN ORGANIZED HEALTH CARE EDUCATION/TRAINING PROGRAM
Payer: COMMERCIAL

## 2023-11-07 VITALS
OXYGEN SATURATION: 98 % | DIASTOLIC BLOOD PRESSURE: 100 MMHG | TEMPERATURE: 98.1 F | HEART RATE: 102 BPM | WEIGHT: 121.69 LBS | SYSTOLIC BLOOD PRESSURE: 134 MMHG | RESPIRATION RATE: 16 BRPM

## 2023-11-07 DIAGNOSIS — S09.93XA FACIAL INJURY, INITIAL ENCOUNTER: Primary | ICD-10-CM

## 2023-11-07 DIAGNOSIS — S09.90XA CLOSED HEAD INJURY, INITIAL ENCOUNTER: ICD-10-CM

## 2023-11-07 PROCEDURE — 6360000002 HC RX W HCPCS: Performed by: STUDENT IN AN ORGANIZED HEALTH CARE EDUCATION/TRAINING PROGRAM

## 2023-11-07 PROCEDURE — 99284 EMERGENCY DEPT VISIT MOD MDM: CPT

## 2023-11-07 PROCEDURE — 6370000000 HC RX 637 (ALT 250 FOR IP): Performed by: STUDENT IN AN ORGANIZED HEALTH CARE EDUCATION/TRAINING PROGRAM

## 2023-11-07 PROCEDURE — 90714 TD VACC NO PRESV 7 YRS+ IM: CPT | Performed by: STUDENT IN AN ORGANIZED HEALTH CARE EDUCATION/TRAINING PROGRAM

## 2023-11-07 PROCEDURE — 90471 IMMUNIZATION ADMIN: CPT | Performed by: STUDENT IN AN ORGANIZED HEALTH CARE EDUCATION/TRAINING PROGRAM

## 2023-11-07 PROCEDURE — 70450 CT HEAD/BRAIN W/O DYE: CPT

## 2023-11-07 PROCEDURE — 4500000002 HC ER NO CHARGE

## 2023-11-07 PROCEDURE — 99281 EMR DPT VST MAYX REQ PHY/QHP: CPT

## 2023-11-07 PROCEDURE — 72125 CT NECK SPINE W/O DYE: CPT

## 2023-11-07 PROCEDURE — 70486 CT MAXILLOFACIAL W/O DYE: CPT

## 2023-11-07 RX ORDER — IBUPROFEN 600 MG/1
600 TABLET ORAL
Status: COMPLETED | OUTPATIENT
Start: 2023-11-07 | End: 2023-11-07

## 2023-11-07 RX ORDER — ACETAMINOPHEN 500 MG
1000 TABLET ORAL
Status: COMPLETED | OUTPATIENT
Start: 2023-11-07 | End: 2023-11-07

## 2023-11-07 RX ORDER — TETANUS AND DIPHTHERIA TOXOIDS ADSORBED 2; 2 [LF]/.5ML; [LF]/.5ML
0.5 INJECTION INTRAMUSCULAR
Status: COMPLETED | OUTPATIENT
Start: 2023-11-07 | End: 2023-11-07

## 2023-11-07 RX ADMIN — ACETAMINOPHEN 1000 MG: 500 TABLET ORAL at 13:26

## 2023-11-07 RX ADMIN — TETANUS AND DIPHTHERIA TOXOIDS ADSORBED 0.5 ML: 2; 2 INJECTION INTRAMUSCULAR at 13:25

## 2023-11-07 RX ADMIN — IBUPROFEN 600 MG: 600 TABLET, FILM COATED ORAL at 13:27

## 2023-11-07 ASSESSMENT — PAIN - FUNCTIONAL ASSESSMENT
PAIN_FUNCTIONAL_ASSESSMENT: ACTIVITIES ARE NOT PREVENTED
PAIN_FUNCTIONAL_ASSESSMENT: 0-10

## 2023-11-07 ASSESSMENT — PAIN DESCRIPTION - DESCRIPTORS: DESCRIPTORS: GNAWING

## 2023-11-07 ASSESSMENT — PAIN DESCRIPTION - FREQUENCY: FREQUENCY: CONTINUOUS

## 2023-11-07 ASSESSMENT — PAIN DESCRIPTION - PAIN TYPE: TYPE: ACUTE PAIN

## 2023-11-07 ASSESSMENT — PAIN DESCRIPTION - LOCATION: LOCATION: FACE

## 2023-11-07 ASSESSMENT — PAIN SCALES - GENERAL: PAINLEVEL_OUTOF10: 8
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
## 2023-11-07 NOTE — ED NOTES
RN reviewed discharge instructions with the patient. The patient verbalized understanding. Ambulatory on discharge.         Sudheer Beckett RN  11/07/23 0942

## 2023-11-14 ENCOUNTER — HOSPITAL ENCOUNTER (OUTPATIENT)
Age: 38
Discharge: HOME | End: 2023-11-17
Payer: COMMERCIAL

## 2023-11-14 DIAGNOSIS — R07.81 PLEURODYNIA: ICD-10-CM

## 2023-11-14 PROCEDURE — 71101 X-RAY EXAM UNILAT RIBS/CHEST: CPT

## 2025-07-24 ENCOUNTER — OFFICE VISIT (OUTPATIENT)
Age: 40
End: 2025-07-24

## 2025-07-24 VITALS
OXYGEN SATURATION: 95 % | RESPIRATION RATE: 16 BRPM | WEIGHT: 135 LBS | BODY MASS INDEX: 21.69 KG/M2 | TEMPERATURE: 98.5 F | HEART RATE: 59 BPM | DIASTOLIC BLOOD PRESSURE: 79 MMHG | SYSTOLIC BLOOD PRESSURE: 132 MMHG | HEIGHT: 66 IN

## 2025-07-24 DIAGNOSIS — R03.0 ELEVATED BLOOD PRESSURE READING: ICD-10-CM

## 2025-07-24 DIAGNOSIS — S05.02XA ABRASION OF LEFT CORNEA, INITIAL ENCOUNTER: Primary | ICD-10-CM

## 2025-07-24 RX ORDER — DEXAMETHASONE SODIUM PHOSPHATE 10 MG/ML
10 INJECTION, SOLUTION INTRA-ARTICULAR; INTRALESIONAL; INTRAMUSCULAR; INTRAVENOUS; SOFT TISSUE ONCE
Status: COMPLETED | OUTPATIENT
Start: 2025-07-24 | End: 2025-07-24

## 2025-07-24 RX ORDER — ERYTHROMYCIN 5 MG/G
OINTMENT OPHTHALMIC
Qty: 1 EACH | Refills: 0 | Status: SHIPPED | OUTPATIENT
Start: 2025-07-24

## 2025-07-24 RX ADMIN — DEXAMETHASONE SODIUM PHOSPHATE 10 MG: 10 INJECTION, SOLUTION INTRA-ARTICULAR; INTRALESIONAL; INTRAMUSCULAR; INTRAVENOUS; SOFT TISSUE at 14:59

## 2025-07-24 ASSESSMENT — ENCOUNTER SYMPTOMS
GASTROINTESTINAL NEGATIVE: 1
RESPIRATORY NEGATIVE: 1
EYE PAIN: 1
ALLERGIC/IMMUNOLOGIC NEGATIVE: 1

## 2025-07-24 NOTE — PROGRESS NOTES
2025   Idania Abarca (: 1985) is a 40 y.o. female, here for evaluation of the following chief complaint(s):  Eye Pain (Pt c/o eye pain, [place] the beach went in the ocean, noticed last night eye was very red, burning, runny discharge. Tried otc drops. PCP called in eyedrops bc pt thought it was pinkeye. Pt is now having very intense pain and trouble opening the left eye )     ASSESSMENT/PLAN:  Below is the assessment and plan developed based on review of pertinent history, physical exam, labs, studies, and medications.         Assessment & Plan  Elevated blood pressure reading   Follow up with PCP within 7 days or sooner   Please seek medical attention at the nearest ED or call 911 for any new or worsening symptoms.       I have discussed the results, diagnosis and treatment plan with the patient. The patient also understands that early in the process of an illness, an urgent care workup can be falsely reassuring. Routine discharge counseling and specific return precautions discussed with patient and the patient understands that worsening, changing or persistent symptoms should prompt an immediate return to the urgent care or emergency department. Patient/Guardian expressed understanding and agrees with the discharge plan. No further questions at time of discharge.       Orders:    Ambulatory referral to Internal Medicine    Abrasion of left cornea, initial encounter   Vital signs stable  Patient in no acute distress    Topical anesthetic was instilled with good anesthesia using 1 GTT of 0.5% tetracaine. Fluoroscein stain of the eye was performed with no uptake of dye. No epithelial defect was noted. No foreign body, ulcer or dendritic lesions. Upper eyelid was everted, no foreign bodies or lesions were noted. No Haroon sign. Patient tolerated procedure well, no adverse reaction or complications.    Patient presents with symptoms of corneal abrasion confirmed by fluorescein uptake. There is

## 2025-07-24 NOTE — PATIENT INSTRUCTIONS
Thank you for visiting [facility] today.    General Instructions:  · Do not rub or touch your eye. Do not wash out your eye.  · Do not wear contact lenses until your doctor says that this is okay  · Avoid bright light  · Avoid straining your eyes  · Keep all follow up visits as told by your doctor. Doing this can help to prevent infection and loss of eyesight  -Ibuprofen/Tylenol for pain as needed  Contact a doctor if:  · You keep having eye pain and other symptoms for more than 2 days  · You get new symptoms, such as more redness, watery eyes or discharge  · You have discharge that makes your eyelids stick together in the morning  · Symptoms come back after your eye heals  Get help right away if:  · You have very bad eye pain that does not get better with medicine  · You lose eyesight    Please follow up with your PCP or ophthalmologist if your signs and symptoms have not resolved or worsened.    Please immediately go to the ER if you develop any changes in eye vision, vision loss, double vision or pain with eye movement.       Learning About Being Physically Active  What is physical activity?     Being physically active means doing any kind of activity that gets your body moving.  The types of physical activity that can help you get fit and stay healthy include:  Aerobic or \"cardio\" activities. These make your heart beat faster and make you breathe harder, such as brisk walking, riding a bike, or running. They strengthen your heart and lungs and build up your endurance.  Strength training activities. These make your muscles work against, or \"resist,\" something. Examples include lifting weights or doing push-ups. These activities help tone and strengthen your muscles and bones.  Stretches. These let you move your joints and muscles through their full range of motion. Stretching helps you be more flexible.  Reaching a balance between these three types of physical activity is important because each one